# Patient Record
Sex: FEMALE | Race: WHITE | Employment: FULL TIME | URBAN - NONMETROPOLITAN AREA
[De-identification: names, ages, dates, MRNs, and addresses within clinical notes are randomized per-mention and may not be internally consistent; named-entity substitution may affect disease eponyms.]

---

## 2021-06-15 PROBLEM — J30.1 NON-SEASONAL ALLERGIC RHINITIS DUE TO POLLEN: Status: ACTIVE | Noted: 2020-06-25

## 2021-06-15 PROBLEM — J32.9 CHRONIC SINUSITIS: Status: ACTIVE | Noted: 2020-06-25

## 2021-06-15 PROBLEM — F41.1 GENERALIZED ANXIETY DISORDER: Status: ACTIVE | Noted: 2018-10-26

## 2021-09-10 ENCOUNTER — HOSPITAL ENCOUNTER (INPATIENT)
Age: 31
LOS: 2 days | Discharge: HOME | End: 2021-09-13
Attending: ADVANCED PRACTICE MIDWIFE | Admitting: ADVANCED PRACTICE MIDWIFE
Payer: COMMERCIAL

## 2021-09-10 PROBLEM — O47.9 UTERINE CONTRACTIONS DURING PREGNANCY: Status: ACTIVE | Noted: 2021-09-10

## 2021-09-10 LAB
BILIRUBIN URINE: NEGATIVE
COLOR: YELLOW
COMMENT UA: NORMAL
GLUCOSE URINE: NEGATIVE
KETONES, URINE: NEGATIVE
LEUKOCYTE ESTERASE, URINE: NEGATIVE
NITRITE, URINE: NEGATIVE
PH UA: 7 (ref 5–9)
PROTEIN UA: NEGATIVE
SPECIFIC GRAVITY UA: 1.01 (ref 1.01–1.02)
TURBIDITY: CLEAR
URINE HGB: NEGATIVE
UROBILINOGEN, URINE: NORMAL

## 2021-09-10 PROCEDURE — 87086 URINE CULTURE/COLONY COUNT: CPT

## 2021-09-10 PROCEDURE — 81003 URINALYSIS AUTO W/O SCOPE: CPT

## 2021-09-10 RX ORDER — ASPIRIN 81 MG/1
162 TABLET ORAL DAILY
Status: ON HOLD | COMMUNITY
End: 2021-09-13 | Stop reason: HOSPADM

## 2021-09-11 ENCOUNTER — ANESTHESIA EVENT (OUTPATIENT)
Dept: LABOR AND DELIVERY | Age: 31
End: 2021-09-11
Payer: COMMERCIAL

## 2021-09-11 ENCOUNTER — ANESTHESIA (OUTPATIENT)
Dept: LABOR AND DELIVERY | Age: 31
End: 2021-09-11
Payer: COMMERCIAL

## 2021-09-11 LAB
ABSOLUTE EOS #: 0.15 K/UL (ref 0–0.44)
ABSOLUTE IMMATURE GRANULOCYTE: 0.13 K/UL (ref 0–0.3)
ABSOLUTE LYMPH #: 3.39 K/UL (ref 1.1–3.7)
ABSOLUTE MONO #: 0.88 K/UL (ref 0.1–1.2)
BASOPHILS # BLD: 0 % (ref 0–2)
BASOPHILS ABSOLUTE: 0.04 K/UL (ref 0–0.2)
DIFFERENTIAL TYPE: ABNORMAL
EOSINOPHILS RELATIVE PERCENT: 1 % (ref 1–4)
GLUCOSE BLD-MCNC: 97 MG/DL (ref 74–100)
HCT VFR BLD CALC: 35.3 % (ref 36.3–47.1)
HEMOGLOBIN: 11.5 G/DL (ref 11.9–15.1)
IMMATURE GRANULOCYTES: 1 %
LYMPHOCYTES # BLD: 24 % (ref 24–43)
MCH RBC QN AUTO: 29.6 PG (ref 25.2–33.5)
MCHC RBC AUTO-ENTMCNC: 32.6 G/DL (ref 28.4–34.8)
MCV RBC AUTO: 91 FL (ref 82.6–102.9)
MONOCYTES # BLD: 6 % (ref 3–12)
NRBC AUTOMATED: 0 PER 100 WBC
PDW BLD-RTO: 13.7 % (ref 11.8–14.4)
PLATELET # BLD: 231 K/UL (ref 138–453)
PLATELET ESTIMATE: ABNORMAL
PMV BLD AUTO: 11.2 FL (ref 8.1–13.5)
RBC # BLD: 3.88 M/UL (ref 3.95–5.11)
RBC # BLD: ABNORMAL 10*6/UL
SEG NEUTROPHILS: 68 % (ref 36–65)
SEGMENTED NEUTROPHILS ABSOLUTE COUNT: 9.81 K/UL (ref 1.5–8.1)
WBC # BLD: 14.4 K/UL (ref 3.5–11.3)
WBC # BLD: ABNORMAL 10*3/UL

## 2021-09-11 PROCEDURE — 85025 COMPLETE CBC W/AUTO DIFF WBC: CPT

## 2021-09-11 PROCEDURE — 0HQ9XZZ REPAIR PERINEUM SKIN, EXTERNAL APPROACH: ICD-10-PCS | Performed by: OBSTETRICS & GYNECOLOGY

## 2021-09-11 PROCEDURE — 82947 ASSAY GLUCOSE BLOOD QUANT: CPT

## 2021-09-11 PROCEDURE — 2580000003 HC RX 258: Performed by: ADVANCED PRACTICE MIDWIFE

## 2021-09-11 PROCEDURE — 2500000003 HC RX 250 WO HCPCS: Performed by: NURSE ANESTHETIST, CERTIFIED REGISTERED

## 2021-09-11 PROCEDURE — 0UQMXZZ REPAIR VULVA, EXTERNAL APPROACH: ICD-10-PCS | Performed by: OBSTETRICS & GYNECOLOGY

## 2021-09-11 PROCEDURE — 6360000002 HC RX W HCPCS: Performed by: NURSE ANESTHETIST, CERTIFIED REGISTERED

## 2021-09-11 PROCEDURE — 7200000001 HC VAGINAL DELIVERY

## 2021-09-11 PROCEDURE — 36415 COLL VENOUS BLD VENIPUNCTURE: CPT

## 2021-09-11 PROCEDURE — 59025 FETAL NON-STRESS TEST: CPT | Performed by: ADVANCED PRACTICE MIDWIFE

## 2021-09-11 PROCEDURE — 10907ZC DRAINAGE OF AMNIOTIC FLUID, THERAPEUTIC FROM PRODUCTS OF CONCEPTION, VIA NATURAL OR ARTIFICIAL OPENING: ICD-10-PCS | Performed by: OBSTETRICS & GYNECOLOGY

## 2021-09-11 PROCEDURE — 1220000000 HC SEMI PRIVATE OB R&B

## 2021-09-11 PROCEDURE — 6370000000 HC RX 637 (ALT 250 FOR IP): Performed by: ADVANCED PRACTICE MIDWIFE

## 2021-09-11 PROCEDURE — 3700000025 EPIDURAL BLOCK: Performed by: NURSE ANESTHETIST, CERTIFIED REGISTERED

## 2021-09-11 PROCEDURE — 6360000002 HC RX W HCPCS: Performed by: ADVANCED PRACTICE MIDWIFE

## 2021-09-11 RX ORDER — ACETAMINOPHEN 325 MG/1
650 TABLET ORAL EVERY 4 HOURS PRN
Status: DISCONTINUED | OUTPATIENT
Start: 2021-09-11 | End: 2021-09-11

## 2021-09-11 RX ORDER — MODIFIED LANOLIN
OINTMENT (GRAM) TOPICAL PRN
Status: DISCONTINUED | OUTPATIENT
Start: 2021-09-11 | End: 2021-09-13 | Stop reason: HOSPADM

## 2021-09-11 RX ORDER — ONDANSETRON 2 MG/ML
4 INJECTION INTRAMUSCULAR; INTRAVENOUS EVERY 6 HOURS PRN
Status: DISCONTINUED | OUTPATIENT
Start: 2021-09-11 | End: 2021-09-13 | Stop reason: HOSPADM

## 2021-09-11 RX ORDER — LIDOCAINE HYDROCHLORIDE 10 MG/ML
30 INJECTION, SOLUTION EPIDURAL; INFILTRATION; INTRACAUDAL; PERINEURAL PRN
Status: DISCONTINUED | OUTPATIENT
Start: 2021-09-11 | End: 2021-09-11

## 2021-09-11 RX ORDER — SODIUM CHLORIDE 0.9 % (FLUSH) 0.9 %
10 SYRINGE (ML) INJECTION PRN
Status: DISCONTINUED | OUTPATIENT
Start: 2021-09-11 | End: 2021-09-13 | Stop reason: HOSPADM

## 2021-09-11 RX ORDER — NALOXONE HYDROCHLORIDE 0.4 MG/ML
0.4 INJECTION, SOLUTION INTRAMUSCULAR; INTRAVENOUS; SUBCUTANEOUS PRN
Status: DISCONTINUED | OUTPATIENT
Start: 2021-09-11 | End: 2021-09-11

## 2021-09-11 RX ORDER — SEVOFLURANE 250 ML/250ML
1 LIQUID RESPIRATORY (INHALATION) CONTINUOUS PRN
Status: DISCONTINUED | OUTPATIENT
Start: 2021-09-11 | End: 2021-09-11

## 2021-09-11 RX ORDER — NALBUPHINE HCL 10 MG/ML
10 AMPUL (ML) INJECTION
Status: DISCONTINUED | OUTPATIENT
Start: 2021-09-11 | End: 2021-09-11

## 2021-09-11 RX ORDER — LIDOCAINE HYDROCHLORIDE 20 MG/ML
INJECTION, SOLUTION EPIDURAL; INFILTRATION; INTRACAUDAL; PERINEURAL PRN
Status: DISCONTINUED | OUTPATIENT
Start: 2021-09-11 | End: 2021-09-11 | Stop reason: SDUPTHER

## 2021-09-11 RX ORDER — SODIUM CHLORIDE, SODIUM LACTATE, POTASSIUM CHLORIDE, CALCIUM CHLORIDE 600; 310; 30; 20 MG/100ML; MG/100ML; MG/100ML; MG/100ML
500 INJECTION, SOLUTION INTRAVENOUS PRN
Status: DISCONTINUED | OUTPATIENT
Start: 2021-09-11 | End: 2021-09-11

## 2021-09-11 RX ORDER — MISOPROSTOL 100 UG/1
900 TABLET ORAL PRN
Status: DISCONTINUED | OUTPATIENT
Start: 2021-09-11 | End: 2021-09-11

## 2021-09-11 RX ORDER — ROPIVACAINE HYDROCHLORIDE 2 MG/ML
INJECTION, SOLUTION EPIDURAL; INFILTRATION; PERINEURAL CONTINUOUS PRN
Status: DISCONTINUED | OUTPATIENT
Start: 2021-09-11 | End: 2021-09-11 | Stop reason: SDUPTHER

## 2021-09-11 RX ORDER — ROPIVACAINE HYDROCHLORIDE 2 MG/ML
12 INJECTION, SOLUTION EPIDURAL; INFILTRATION; PERINEURAL CONTINUOUS
Status: DISCONTINUED | OUTPATIENT
Start: 2021-09-11 | End: 2021-09-11

## 2021-09-11 RX ORDER — SODIUM CHLORIDE 0.9 % (FLUSH) 0.9 %
5-40 SYRINGE (ML) INJECTION PRN
Status: DISCONTINUED | OUTPATIENT
Start: 2021-09-11 | End: 2021-09-11

## 2021-09-11 RX ORDER — SODIUM CHLORIDE 0.9 % (FLUSH) 0.9 %
10 SYRINGE (ML) INJECTION EVERY 12 HOURS SCHEDULED
Status: DISCONTINUED | OUTPATIENT
Start: 2021-09-11 | End: 2021-09-13 | Stop reason: HOSPADM

## 2021-09-11 RX ORDER — DOCUSATE SODIUM 100 MG/1
100 CAPSULE, LIQUID FILLED ORAL 2 TIMES DAILY PRN
Status: DISCONTINUED | OUTPATIENT
Start: 2021-09-11 | End: 2021-09-13 | Stop reason: HOSPADM

## 2021-09-11 RX ORDER — METHYLERGONOVINE MALEATE 0.2 MG/ML
200 INJECTION INTRAVENOUS PRN
Status: DISCONTINUED | OUTPATIENT
Start: 2021-09-11 | End: 2021-09-11

## 2021-09-11 RX ORDER — CARBOPROST TROMETHAMINE 250 UG/ML
250 INJECTION, SOLUTION INTRAMUSCULAR PRN
Status: DISCONTINUED | OUTPATIENT
Start: 2021-09-11 | End: 2021-09-11

## 2021-09-11 RX ORDER — ONDANSETRON 2 MG/ML
4 INJECTION INTRAMUSCULAR; INTRAVENOUS EVERY 6 HOURS PRN
Status: DISCONTINUED | OUTPATIENT
Start: 2021-09-11 | End: 2021-09-11

## 2021-09-11 RX ORDER — SODIUM CHLORIDE, SODIUM LACTATE, POTASSIUM CHLORIDE, CALCIUM CHLORIDE 600; 310; 30; 20 MG/100ML; MG/100ML; MG/100ML; MG/100ML
INJECTION, SOLUTION INTRAVENOUS CONTINUOUS
Status: DISCONTINUED | OUTPATIENT
Start: 2021-09-11 | End: 2021-09-11

## 2021-09-11 RX ORDER — SODIUM CHLORIDE, SODIUM LACTATE, POTASSIUM CHLORIDE, CALCIUM CHLORIDE 600; 310; 30; 20 MG/100ML; MG/100ML; MG/100ML; MG/100ML
1000 INJECTION, SOLUTION INTRAVENOUS PRN
Status: DISCONTINUED | OUTPATIENT
Start: 2021-09-11 | End: 2021-09-11

## 2021-09-11 RX ORDER — ROPIVACAINE HYDROCHLORIDE 2 MG/ML
10 INJECTION, SOLUTION EPIDURAL; INFILTRATION; PERINEURAL CONTINUOUS
Status: DISCONTINUED | OUTPATIENT
Start: 2021-09-11 | End: 2021-09-11

## 2021-09-11 RX ORDER — ACETAMINOPHEN 325 MG/1
650 TABLET ORAL EVERY 4 HOURS PRN
Status: DISCONTINUED | OUTPATIENT
Start: 2021-09-11 | End: 2021-09-13 | Stop reason: HOSPADM

## 2021-09-11 RX ORDER — SODIUM CHLORIDE 0.9 % (FLUSH) 0.9 %
5-40 SYRINGE (ML) INJECTION EVERY 12 HOURS SCHEDULED
Status: DISCONTINUED | OUTPATIENT
Start: 2021-09-11 | End: 2021-09-11

## 2021-09-11 RX ORDER — FAMOTIDINE 20 MG/1
20 TABLET, FILM COATED ORAL 2 TIMES DAILY PRN
Status: DISCONTINUED | OUTPATIENT
Start: 2021-09-11 | End: 2021-09-13 | Stop reason: HOSPADM

## 2021-09-11 RX ORDER — SODIUM CHLORIDE 9 MG/ML
25 INJECTION, SOLUTION INTRAVENOUS PRN
Status: DISCONTINUED | OUTPATIENT
Start: 2021-09-11 | End: 2021-09-13 | Stop reason: HOSPADM

## 2021-09-11 RX ORDER — EPHEDRINE SULFATE/0.9% NACL/PF 50 MG/5 ML
5 SYRINGE (ML) INTRAVENOUS EVERY 5 MIN PRN
Status: DISCONTINUED | OUTPATIENT
Start: 2021-09-11 | End: 2021-09-11

## 2021-09-11 RX ORDER — IBUPROFEN 800 MG/1
800 TABLET ORAL EVERY 8 HOURS
Status: DISCONTINUED | OUTPATIENT
Start: 2021-09-11 | End: 2021-09-13 | Stop reason: HOSPADM

## 2021-09-11 RX ORDER — SODIUM CHLORIDE 9 MG/ML
25 INJECTION, SOLUTION INTRAVENOUS PRN
Status: DISCONTINUED | OUTPATIENT
Start: 2021-09-11 | End: 2021-09-11

## 2021-09-11 RX ADMIN — SODIUM CHLORIDE, POTASSIUM CHLORIDE, SODIUM LACTATE AND CALCIUM CHLORIDE: 600; 310; 30; 20 INJECTION, SOLUTION INTRAVENOUS at 08:26

## 2021-09-11 RX ADMIN — SODIUM CHLORIDE, POTASSIUM CHLORIDE, SODIUM LACTATE AND CALCIUM CHLORIDE 1000 ML: 600; 310; 30; 20 INJECTION, SOLUTION INTRAVENOUS at 07:27

## 2021-09-11 RX ADMIN — ONDANSETRON 4 MG: 2 INJECTION INTRAMUSCULAR; INTRAVENOUS at 14:04

## 2021-09-11 RX ADMIN — IBUPROFEN 800 MG: 800 TABLET, FILM COATED ORAL at 17:07

## 2021-09-11 RX ADMIN — LIDOCAINE HYDROCHLORIDE 3 ML: 20 INJECTION, SOLUTION EPIDURAL; INFILTRATION; INTRACAUDAL; PERINEURAL at 08:12

## 2021-09-11 RX ADMIN — Medication: at 17:08

## 2021-09-11 RX ADMIN — ROPIVACAINE HYDROCHLORIDE 12 ML/HR: 2 INJECTION, SOLUTION EPIDURAL; INFILTRATION at 08:20

## 2021-09-11 RX ADMIN — LIDOCAINE HYDROCHLORIDE 2 ML: 20 INJECTION, SOLUTION EPIDURAL; INFILTRATION; INTRACAUDAL; PERINEURAL at 08:10

## 2021-09-11 RX ADMIN — BENZOCAINE AND LEVOMENTHOL: 200; 5 SPRAY TOPICAL at 20:13

## 2021-09-11 RX ADMIN — Medication 1 MILLI-UNITS/MIN: at 10:09

## 2021-09-11 RX ADMIN — Medication 40 EACH: at 20:13

## 2021-09-11 RX ADMIN — SODIUM CHLORIDE, POTASSIUM CHLORIDE, SODIUM LACTATE AND CALCIUM CHLORIDE: 600; 310; 30; 20 INJECTION, SOLUTION INTRAVENOUS at 09:58

## 2021-09-11 ASSESSMENT — PAIN DESCRIPTION - DESCRIPTORS
DESCRIPTORS: CRAMPING;SHARP
DESCRIPTORS: ACHING;CRAMPING
DESCRIPTORS: ACHING;CRAMPING

## 2021-09-11 ASSESSMENT — PAIN SCALES - GENERAL: PAINLEVEL_OUTOF10: 3

## 2021-09-11 NOTE — FLOWSHEET NOTE
JEFRY Murdock CNM called with episode. Told vitals and reactive tracing. She would like  A POCT BS and she will let Dr González Cunningham know. She would like bed padded also.

## 2021-09-11 NOTE — FLOWSHEET NOTE
JEFRY duncanm updated on pt. SVE, ctx pattern, pain level et pt requesting epidural. No further orders at this time.

## 2021-09-11 NOTE — PROGRESS NOTES
I was called into room post episode. It was visualized pt had /53. Pt knew who she was and had no loss of stool or loss of urine. When talking with pt she states in her past she has had episodes of passing out. At this point in time. Vistas are stable and pulse oz 100. Nurses are calling in CRNA for evaluation as well. RN also talking with [place] Doing.

## 2021-09-11 NOTE — ANESTHESIA PRE PROCEDURE
Department of Anesthesiology  Preprocedure Note       Name:  Juany Lira   Age:  28 y.o.  :  1990                                          MRN:  [id number]         Date:  2021      Surgeon: * No surgeons listed *    Procedure: * No procedures listed *    Medications prior to admission:   Prior to Admission medications    Medication Sig Start Date End Date Taking?  Authorizing Provider   aspirin 81 MG EC tablet Take 162 mg by mouth daily   Yes Historical Provider, MD   montelukast (SINGULAIR) 10 MG tablet Take 1 tablet by mouth once a day 6/15/21  Yes Gia Sloan MD   Prenatal MV-Min-Fe Fum-FA-DHA (PRENATAL 1 PO) Take by mouth   Yes Historical Provider, MD   budesonide (RHINOCORT ALLERGY) 32 MCG/ACT nasal spray 1 spray by Each Nostril route daily   Yes Historical Provider, MD   cetirizine (ZYRTEC) 10 MG tablet Take 10 mg by mouth daily   Yes Historical Provider, MD       Current medications:    Current Facility-Administered Medications   Medication Dose Route Frequency Provider Last Rate Last Admin    oxytocin (PITOCIN) 30 units in 500 mL infusion  1 jessenia-units/min IntraVENous Continuous Gavi Likens, APRN - CNM        lactated ringers infusion   IntraVENous Continuous Gavi Likens, APRN - CNM        lactated ringers infusion 500 mL  500 mL IntraVENous PRN Gavi Likens, APRN - CNM        Or    lactated ringers infusion 1,000 mL  1,000 mL IntraVENous PRN Gavi Likens, APRN -  mL/hr at 21 0727 1,000 mL at 21 0727    sodium chloride flush 0.9 % injection 5-40 mL  5-40 mL IntraVENous 2 times per day Gavi Likens, APRN - CNM        sodium chloride flush 0.9 % injection 5-40 mL  5-40 mL IntraVENous PRN Gavi Likens, APRN - CNM        0.9 % sodium chloride infusion  25 mL IntraVENous PRN Gavi Likens, APRN - CNM        lidocaine PF 1 % injection 30 mL  30 mL Other PRN Gavi Likens, APRN - CNM        nalbuphine (NUBAIN) injection 10 mg  10 mg IntraVENous Q2H PRN Geovanna Emms, APRN - CNM        nitrous oxide 50% inhalation 1 each  1 each Inhalation Continuous PRN Geovanna Emms, APRN - CNM        ondansetron [facility]) injection 4 mg  4 mg IntraVENous Q6H PRN Geovanna Emms, APRN - CNM        oxytocin (PITOCIN) 30 units in 500 mL infusion  166 jessenia-units/min IntraVENous PRN Geovanna Emms, APRN - CNM        And    oxytocin (PITOCIN) 10 unit bolus from the bag  999 mL/hr IntraVENous PRN Geovanna Emms, APRN - CNM        methylergonovine (METHERGINE) injection 200 mcg  200 mcg IntraMUSCular PRN Geovanna Emms, APRN - CNM        carboprost (HEMABATE) injection 250 mcg  250 mcg IntraMUSCular PRN Geovanna Emms, APRN - CNM        miSOPROStol (CYTOTEC) tablet 900 mcg  900 mcg Rectal PRN Geovanna Emms, APRN - CNM        acetaminophen (TYLENOL) tablet 650 mg  650 mg Oral Q4H PRN Geovanna Emms, APRN - CNM        witch hazel-glycerin (TUCKS) pad   Topical PRN Geovanna Emms, APRN - CNM        benzocaine-menthol (DERMOPLAST) 20-0.5 % spray   Topical PRN Geovanna Emms, APRN - CNM           Allergies:     Allergies   Allergen Reactions    Cat Hair Extract Other (See Comments)     Periorbital swelling    Dust Mite Extract      Periorbital swelling    Tree Extract      Periorbital swelling       Problem List:    Patient Active Problem List   Diagnosis Code    Chronic sinusitis J32.9    Generalized anxiety disorder F41.1    Non-seasonal allergic rhinitis due to pollen J30.1    Uterine contractions during pregnancy O62.2       Past Medical History:        Diagnosis Date    Allergic rhinitis     Anxiety     Environmental allergies        Past Surgical History:        Procedure Laterality Date    SINUS SURGERY  08/2020    TONSILLECTOMY      WISDOM TOOTH EXTRACTION         Social History:    Social History     Tobacco Use    Smoking status: Never Smoker    Smokeless tobacco: Never Used   Substance Use Topics    Alcohol use: Not Currently                                Counseling given: Not Answered      Vital Signs (Current):   Vitals:    09/10/21 2252 09/11/21 0110 09/11/21 0325   BP:  128/77 129/79   Pulse:  69 83   Resp:  18 18   Temp:  36.4 °C (97.6 °F) 36.4 °C (97.6 °F)   Weight: 234 lb (106.1 kg)     Height: 5' 10\" (1.778 m)                                                BP Readings from Last 3 Encounters:   09/11/21 129/79   09/08/21 115/76   09/01/21 115/75       NPO Status:                                                                                 BMI:   Wt Readings from Last 3 Encounters:   09/10/21 234 lb (106.1 kg)   09/08/21 234 lb 4 oz (106.3 kg)   09/01/21 232 lb 6 oz (105.4 kg)     Body mass index is 33.58 kg/m². CBC:   Lab Results   Component Value Date    WBC 14.4 09/11/2021    RBC 3.88 09/11/2021    RBC 3.79 07/22/2021    HGB 11.5 09/11/2021    HCT 35.3 09/11/2021    MCV 91.0 09/11/2021    RDW 13.7 09/11/2021     09/11/2021       CMP: No results found for: NA, K, CL, CO2, BUN, CREATININE, GFRAA, AGRATIO, LABGLOM, GLUCOSE, PROT, CALCIUM, BILITOT, ALKPHOS, AST, ALT    POC Tests: No results for input(s): POCGLU, POCNA, POCK, POCCL, POCBUN, POCHEMO, POCHCT in the last 72 hours.     Coags: No results found for: PROTIME, INR, APTT    HCG (If Applicable):   Lab Results   Component Value Date    PREGTESTUR NEG 01/25/2021        ABGs: No results found for: PHART, PO2ART, XWV6MIF, JVX0LWL, BEART, O0RWEECO     Type & Screen (If Applicable):  Lab Results   Component Value Date    [address] POSITIVE 02/15/2021       Drug/Infectious Status (If Applicable):  No results found for: HIV, HEPCAB    COVID-19 Screening (If Applicable): No results found for: COVID19        Anesthesia Evaluation  Patient summary reviewed and Nursing notes reviewed no history of anesthetic complications:   Airway: Mallampati: I  TM distance: >3 FB   Neck ROM: full  Mouth opening: > = 3 FB Dental: normal exam         Pulmonary:Negative Pulmonary ROS

## 2021-09-11 NOTE — L&D DELIVERY NOTE
disposition: Lab     Placenta    Date/time: 2021 15:32:00  Removal: Spontaneous  Appearance: Intact  Disposition: Discarded     Delivery Resuscitation    Method: Bulb Suction, Stimulation     Apgars    Living status: Living  Apgars   1 Minute:  5 Minute:  10 Minute 15 Minute 20 Minute   Skin Color: 1  1       Heart Rate: 2  2       Reflex Irritability: 2  2       Muscle Tone: 2  2       Respiratory Effort: 2  2       Total: 9  9               Apgars Assigned Gely Michael RN     Skin to Skin    Skin to skin initiation date/time: 21 15:26:00 EDT   Skin to skin with:  Mother  Skin to skin end date/time:        [place] Measurements       Delivery Information    Episiotomy: None  Perineal lacerations: 1st Repaired: Yes   Labial laceration: right Repaired: Yes   Vaginal laceration: No    Cervical laceration: No    Surgical or additional est. blood loss (mL): 0 (View Only): Edit in Flowsheets   Combined est. blood loss (mL): 0     Vaginal Delivery Counts    Initial count personnel: Alice Gongora LPN  Initial count verified by: Kailyn Neves RN   4x4:  Needles:  Instruments:  Lap Pads:  Sponges:    Initial counts:         Final counts:            Other Procedures    Procedures: None

## 2021-09-11 NOTE — PROGRESS NOTES
Pt has had some cervical change and we will continue to monitor pt and see if she continues making change. Labor orders are lace for is she goes into active labor.

## 2021-09-11 NOTE — H&P
Department of Obstetrics and Gynecology   Obstetrics History and Physical  H&P Admission Inpatient  Note        CHIEF COMPLAINT:    Chief Complaint   Patient presents with    Contractions        HISTORY OF PRESENT ILLNESS:      The patient is a 28 y.o. female at 37w0d.   OB History        1    Para   0    Term   0       0    AB   0    Living   0       SAB   0    TAB   0    Ectopic   0    Molar   0    Multiple   0    Live Births   0            Patient presents with a chief complaint as above and is being admitted for labor    Estimated Due Date: Estimated Date of Delivery: 21    PRENATAL CARE:    Complicated by: none    PAST OB HISTORY:  OB History        1    Para   0    Term   0       0    AB   0    Living   0       SAB   0    TAB   0    Ectopic   0    Molar   0    Multiple   0    Live Births   0                Past Medical History:        Diagnosis Date    Allergic rhinitis     Anxiety     Environmental allergies      Past Surgical History:        Procedure Laterality Date    SINUS SURGERY  2020    TONSILLECTOMY      WISDOM TOOTH EXTRACTION       Allergies:  Cat hair extract, Dust mite extract, and Tree extract    Social History:    Social History     Socioeconomic History    Marital status:      Spouse name: Not on file    Number of children: Not on file    Years of education: Not on file    Highest education level: Not on file   Occupational History    Not on file   Tobacco Use    Smoking status: Never Smoker    Smokeless tobacco: Never Used   Vaping Use    Vaping Use: Never used   Substance and Sexual Activity    Alcohol use: Not Currently    Drug use: Never    Sexual activity: Yes     Partners: Male   Other Topics Concern    Not on file   Social History Narrative    Not on file     Social Determinants of Health     Financial Resource Strain:     Difficulty of Paying Living Expenses:    Food Insecurity:     Worried About Running Out of Food in the Last Year:     Ran Out of Food in the Last Year:    Transportation Needs:     Lack of Transportation (Medical):  Lack of Transportation (Non-Medical):    Physical Activity:     Days of Exercise per Week:     Minutes of Exercise per Session:    Stress:     Feeling of Stress :    Social Connections:     Frequency of Communication with Friends and Family:     Frequency of Social Gatherings with Friends and Family:     Attends [employer] Services:     Active Member of Clubs or Organizations:     Attends Club or Organization Meetings:     Marital Status:    Intimate Partner Violence:     Fear of Current or Ex-Partner:     Emotionally Abused:     Physically Abused:     Sexually Abused:      Family History:       Problem Relation Age of Onset    Breast Cancer Maternal Aunt 28    Heart Disease Maternal Grandmother      Medications Prior to Admission:  Medications Prior to Admission: aspirin 81 MG EC tablet, Take 162 mg by mouth daily  montelukast (SINGULAIR) 10 MG tablet, Take 1 tablet by mouth once a day  Prenatal MV-Min-Fe Fum-FA-DHA (PRENATAL 1 PO), Take by mouth  budesonide (RHINOCORT ALLERGY) 32 MCG/ACT nasal spray, 1 spray by Each Nostril route daily  cetirizine (ZYRTEC) 10 MG tablet, Take 10 mg by mouth daily    REVIEW OF SYSTEMS:    CONSTITUTIONAL:  negative  RESPIRATORY:  negative  CARDIOVASCULAR:  negative  GASTROINTESTINAL:  negative  ALLERGIC/IMMUNOLOGIC:  negative  NEUROLOGICAL:  negative  BEHAVIOR/PSYCH:  negative    PHYSICAL EXAM:  Vitals:    09/11/21 1123 09/11/21 1128 09/11/21 1131 09/11/21 1133   BP:       Pulse:       Resp:       Temp:       SpO2: 100% 100% 92% 98%   Weight:       Height:         General appearance:  awake, alert, cooperative, no apparent distress, and appears stated age  Neurologic:  Awake, alert, oriented to name, place and time.     Lungs:  No increased work of breathing, good air exchange  Abdomen:  Soft, non tender, gravid, consistent with her gestational age, EFW by Wilfredo Manzanares was 7-15   Fetal heart rate:  Reassuring.   Pelvis:  Adequate pelvis  Cervix: 6 cm 100% soft -1  Contraction frequency:  2-4 minutes    Membranes:  Ruptured clear fluid    Labs:  Blood Type: A POSITIVE    Rubella:  No results found for: RUBG  T. Pallidium, IGG:  No results found for: TREPG  Sickle Cell Screen: No results found for: SICKLE  Hepatitis B Surface Antigen:   Hepatitis B Surface Ag   Date Value Ref Range Status   02/15/2021 Negative Negative Final     HIV:  No results found for: [id number]           Results for orders placed or performed during the hospital encounter of 09/10/21   Urinalysis   Result Value Ref Range    Color, UA YELLOW YELLOW    Turbidity UA CLEAR CLEAR    Glucose, Ur NEGATIVE NEGATIVE    Bilirubin Urine NEGATIVE NEGATIVE    Ketones, Urine NEGATIVE NEGATIVE    Specific [place], UA 1.010 1.010 - 1.020    Urine Hgb NEGATIVE NEGATIVE    pH, UA 7.0 5.0 - 9.0    Protein, UA NEGATIVE NEGATIVE    Urobilinogen, Urine Normal Normal    Nitrite, Urine NEGATIVE NEGATIVE    Leukocyte Esterase, Urine NEGATIVE NEGATIVE    Urinalysis Comments NOT REPORTED    CBC auto differential   Result Value Ref Range    WBC 14.4 (H) 3.5 - 11.3 k/uL    RBC 3.88 (L) 3.95 - 5.11 m/uL    Hemoglobin 11.5 (L) 11.9 - 15.1 g/dL    Hematocrit 35.3 (L) 36.3 - 47.1 %    MCV 91.0 82.6 - 102.9 fL    MCH 29.6 25.2 - 33.5 pg    MCHC 32.6 28.4 - 34.8 g/dL    RDW 13.7 11.8 - 14.4 %    Platelets 883 730 - 979 k/uL    MPV 11.2 8.1 - 13.5 fL    NRBC Automated 0.0 0.0 per 100 WBC    Differential Type NOT REPORTED     Seg Neutrophils 68 (H) 36 - 65 %    Lymphocytes 24 24 - 43 %    Monocytes 6 3 - 12 %    Eosinophils % 1 1 - 4 %    Basophils 0 0 - 2 %    Immature Granulocytes 1 (H) 0 %    Segs Absolute 9.81 (H) 1.50 - 8.10 k/uL    Absolute Lymph # 3.39 1.10 - 3.70 k/uL    Absolute Mono # 0.88 0.10 - 1.20 k/uL    Absolute Eos # 0.15 0.00 - 0.44 k/uL    Basophils Absolute 0.04 0.00 - 0.20 k/uL    Absolute Immature Granulocyte 0.13 0.00 - 0.30 k/uL    WBC Morphology NOT REPORTED     RBC Morphology NOT REPORTED     Platelet Estimate NOT REPORTED    Glucose, Whole Blood   Result Value Ref Range    POC Glucose 97 74 - 100 mg/dL       ASSESSMENT AND PLAN:    Estimated length of stay: 2 midnights post vaginal delivery    Active Problems:    Uterine contractions during pregnancy     40 weeks pregnancy      Labor: Admit, anticipate normal delivery, routine labor orders  Fetus: Reassuring, Cat 1  GBS: No  Other: pitocin    Reviewed plan with Dr. Lawrence Och updated      Janel Kaur, APRN - YUDYM,CNM,9/11/2021 11:40 AM

## 2021-09-11 NOTE — Clinical Note
Called at 1953 to come see patient due to episode where patient experienced dizziness and became briefly obtunded per nurse, seemed dazed for brief period of time. Patient is now alert and oriented, VS are stable, and were stable throughout event. Patient states she was talking with  from [facility] on the phone when she felt pressure up against her chest, as though pressure pushing up from abdomen into chest and felt \"strange\"  She said she asked her mom to see if her heart rate was ok and says her mom told her it was in the [de-identified]. She then became very dizzy and felt like she was going to pass out. Review of BP during this event shows abberantly elevated BP of 165/58 at 903, followed by dip in BP of 110/53, then stabilizing back to SBP of 120's - 130's. 100% NRB was placed by nursing staff and NABIL BATRES was in to assess patient.

## 2021-09-11 NOTE — ANESTHESIA PROCEDURE NOTES
Epidural Block    Patient location during procedure: OB  Start time: 9/11/2021 8:03 AM  End time: 9/11/2021 8:20 AM  Reason for block: labor epidural  Staffing  Performed: resident/CRNA   Resident/CRNA: JORDAN Mulligan - CRNA  Preanesthetic Checklist  Completed: patient identified, IV checked, site marked, risks and benefits discussed, surgical consent, monitors and equipment checked, pre-op evaluation, timeout performed, anesthesia consent given, oxygen available and patient being monitored  Epidural  Patient position: sitting  Prep: ChloraPrep and site prepped and draped  Patient monitoring: continuous pulse ox and frequent blood pressure checks  Approach: midline  Location: lumbar (1-5)  Injection technique: MACI air  Provider prep: mask and sterile gloves  Needle  Needle type: Tuohy   Needle gauge: 17 G  Needle length: 3.5 in  Needle insertion depth: 6 cm  Catheter type: side hole  Catheter size: 19 G  Catheter at skin depth: 12 cm  Test dose: negative (5 ml lidocaine 1.5%+epi 1:200K @ 0809)  Kit: Sleep Solutionsun Perifix FX  Lot number: [id number]  Expiration date: 1/31/2023  Assessment  Sensory level: T8  Hemodynamics: stable  Attempts: 1  Additional Notes  0803 skin prep and kit prep  0805 drape and lidocaine 1% SQ infiltration at L3-4 area  0808 epidural space achieved easily x 1 attempt, catheter threaded without difficulty  0809 test dose then catheter taped in place and incrementally bolused as charte  0820 epidural infusion initiated and PCEA instructions given to patient, questions answered. Patient feeling more comfortable now.

## 2021-09-11 NOTE — PROGRESS NOTES
Called at 0390 to come see patient due to episode where patient experienced dizziness and became briefly obtunded per nurse, seemed dazed for brief period of time. Patient is now alert and oriented, VS are stable, and were stable throughout event. Patient states she was talking with  from [facility] on the phone when she felt pressure up against her chest, as though pressure pushing up from abdomen into chest and felt \"strange\"  She said she asked her mom to see if her heart rate was ok and says her mom told her it was in the [de-identified]. She then became very dizzy and felt like she was going to pass out. Review of BP during this event shows abberantly elevated BP of 165/58 at 903, followed by dip in BP of 110/53, then stabilizing back to SBP of 120's - 130's. 100% NRB was placed by nursing staff and NABIL BATRES was in to assess patient.

## 2021-09-11 NOTE — FLOWSHEET NOTE
Called to pt room. Pt told her mother that she \"felt weird\" pt head turned to side, eyes half open with glassy look. Sternal rub given, oxygen on, iv fluids increased. Pt comes around after approx 2 min NABIL Leonard CNM at bedside. Pt states that she can remember feeling weird. And then her vision went blurry. Milton Eisenberg RN calls [employer] CN.

## 2021-09-12 LAB
CULTURE: NORMAL
Lab: NORMAL
SPECIMEN DESCRIPTION: NORMAL

## 2021-09-12 PROCEDURE — 1220000000 HC SEMI PRIVATE OB R&B

## 2021-09-12 PROCEDURE — 6370000000 HC RX 637 (ALT 250 FOR IP): Performed by: ADVANCED PRACTICE MIDWIFE

## 2021-09-12 PROCEDURE — 0503F POSTPARTUM CARE VISIT: CPT | Performed by: ADVANCED PRACTICE MIDWIFE

## 2021-09-12 RX ADMIN — IBUPROFEN 800 MG: 800 TABLET, FILM COATED ORAL at 03:26

## 2021-09-12 RX ADMIN — IBUPROFEN 800 MG: 800 TABLET, FILM COATED ORAL at 12:08

## 2021-09-12 ASSESSMENT — PAIN SCALES - GENERAL
PAINLEVEL_OUTOF10: 5
PAINLEVEL_OUTOF10: 4

## 2021-09-12 NOTE — ANESTHESIA POSTPROCEDURE EVALUATION
Department of Anesthesiology  Postprocedure Note    Patient: Vikram Ag  MRN: [id number]  YOB: 1990  Date of evaluation: 9/12/2021  Time:  10:31 AM     Procedure Summary     Date: 09/11/21 Room / Location:     Anesthesia Start: 0803 Anesthesia Stop: 2397    Procedure: Labor Analgesia Diagnosis:     Scheduled Providers:  Responsible Provider: JORDAN Ballard CRNA    Anesthesia Type: epidural ASA Status: 2          Anesthesia Type: epidural    Alex Phase I:      Alex Phase II:      Last vitals: Reviewed and per EMR flowsheets.        Anesthesia Post Evaluation    Patient location during evaluation: floor  Patient participation: complete - patient participated  Level of consciousness: awake and alert  Airway patency: patent  Nausea & Vomiting: no nausea and no vomiting  Complications: no  Cardiovascular status: hemodynamically stable  Respiratory status: acceptable and room air  Hydration status: stable

## 2021-09-12 NOTE — PROGRESS NOTES
Department of Obstetrics and Gynecology  Labor and Delivery       Post Partum Progress Note             SUBJECTIVE:  Pt doing well today but needing breastfeeding support and is a little tired    OBJECTIVE:      Vitals:  /61   Pulse 68   Temp 98.5 °F (36.9 °C)   Resp 16   Ht 5' 10\" (1.778 m)   Wt 234 lb (106.1 kg)   LMP 12/07/2020 (Approximate)   SpO2 98%   BMI 33.58 kg/m²   Patient Vitals for the past 24 hrs:   BP Temp Pulse Resp SpO2   09/12/21 0738 113/61 98.5 °F (36.9 °C) 68 16    09/12/21 0325 125/62 98 °F (36.7 °C) 75 16    09/11/21 2324 123/69 97.9 °F (36.6 °C) 71 18    09/11/21 2011 116/70 98.3 °F (36.8 °C) 105 16    09/11/21 1744 123/63  75     09/11/21 1727 126/64  82     09/11/21 1712 130/65  89     09/11/21 1657 126/68  83     09/11/21 1642 (!) 114/58  75     09/11/21 1627 122/86  79     09/11/21 1612 120/65  86     09/11/21 1542 (!) 141/69  89     09/11/21 1527 (!) 143/74  137     09/11/21 1513     98 %   09/11/21 1512 139/67  118     09/11/21 1508     98 %   09/11/21 1503     99 %   09/11/21 1458     100 %   09/11/21 1457 134/84  109     09/11/21 1456     93 %   09/11/21 1453     100 %   09/11/21 1448     100 %   09/11/21 1443 130/77  87  100 %   09/11/21 1441     91 %   09/11/21 1438     100 %   09/11/21 1433     100 %   09/11/21 1428     100 %   09/11/21 1427 125/73  88     09/11/21 1423     100 %   09/11/21 1418     100 %   09/11/21 1413     100 %   09/11/21 1412 123/71  88 18    09/11/21 1408     100 %   09/11/21 1403     100 %   09/11/21 1358     100 %   09/11/21 1357 122/62  86     09/11/21 1353     100 %   09/11/21 1348     100 %   09/11/21 1343     100 %   09/11/21 1342 132/76  86     09/11/21 1338     100 %   09/11/21 1337     90 %   09/11/21 1333     100 %   09/11/21 1328     100 %   09/11/21 1327 125/77 98.5 °F (36.9 °C) 100 18  09/11/21 1323     100 %   09/11/21 1318     100 %   09/11/21 1315 124/72 98.5 °F (36.9 °C) 99 18 99 %   09/11/21 1308     100 %   09/11/21 1303     100 %   09/11/21 1300     100 %   09/11/21 1258     100 %   09/11/21 1253     100 %   09/11/21 1248     100 %   09/11/21 1245 131/68  86     09/11/21 1243     100 %   09/11/21 1238     100 %   09/11/21 1233     100 %   09/11/21 1230 134/77  93     09/11/21 1228     100 %   09/11/21 1223     100 %   09/11/21 1218     100 %   09/11/21 1213 (!) 113/56  82  100 %   09/11/21 1208     99 %   09/11/21 1203     100 %   09/11/21 1158     99 %   09/11/21 1157 (!) 112/55  82     09/11/21 1153     100 %   09/11/21 1148     98 %   09/11/21 1143 (!) 111/51  78  99 %   09/11/21 1138     99 %   09/11/21 1133     98 %   09/11/21 1131     92 %   09/11/21 1128     100 %   09/11/21 1127 126/74  99     09/11/21 1123     100 %   09/11/21 1118     100 %   09/11/21 1113     100 %   09/11/21 1112 123/64  85 18    09/11/21 1108     100 %   09/11/21 1103  98.2 °F (36.8 °C)   100 %   09/11/21 1058     100 %   09/11/21 1057 (!) 122/57  81     09/11/21 1053     100 %   09/11/21 1048     100 %   09/11/21 1043     100 %   09/11/21 1038 (!) 123/55  80  100 %   09/11/21 1033 129/64  77  100 %   09/11/21 1029 128/66  81     09/11/21 1028     100 %   09/11/21 1024 131/62  78     09/11/21 1023     100 %   09/11/21 1018 132/64  82  100 %   09/11/21 1012 106/63  96  100 %   09/11/21 1009 123/63  82     09/11/21 1008     100 %   09/11/21 1003 125/64  75  100 %   09/11/21 0958 (!) 121/59  80  100 %   09/11/21 0953 (!) 122/57  75  100 %   09/11/21 0948     100 %   09/11/21 0947 129/60  77     09/11/21 0944 (!) 117/57  77     09/11/21 0943     99 %   09/11/21 0941 125/68  88           ABDOMEN: normal shape,

## 2021-09-12 NOTE — PLAN OF CARE
Problem: Anxiety:  Goal: Level of anxiety will decrease  Description: Level of anxiety will decrease  Outcome: Ongoing     Problem: Breathing Pattern - Ineffective:  Goal: Able to breathe comfortably  Description: Able to breathe comfortably  Outcome: Ongoing     Problem: Fluid Volume - Imbalance:  Goal: Absence of imbalanced fluid volume signs and symptoms  Description: Absence of imbalanced fluid volume signs and symptoms  Outcome: Ongoing  Goal: Absence of intrapartum hemorrhage signs and symptoms  Description: Absence of intrapartum hemorrhage signs and symptoms  Outcome: Ongoing     Problem: Infection - Intrapartum Infection:  Goal: Will show no infection signs and symptoms  Description: Will show no infection signs and symptoms  Outcome: Ongoing     Problem:  Screening:  Goal: Ability to make informed decisions regarding treatment has improved  Description: Ability to make informed decisions regarding treatment has improved  Outcome: Ongoing     Problem: Pain - Acute:  Goal: Pain level will decrease  Description: Pain level will decrease  Outcome: Ongoing  Goal: Able to cope with pain  Description: Able to cope with pain  Outcome: Ongoing     Problem: Urinary Retention:  Goal: Experiences of bladder distention will decrease  Description: Experiences of bladder distention will decrease  Outcome: Ongoing  Goal: Urinary elimination within specified parameters  Description: Urinary elimination within specified parameters  Outcome: Ongoing     Problem: Pain:  Goal: Pain level will decrease  Description: Pain level will decrease  Outcome: Ongoing  Goal: Control of acute pain  Description: Control of acute pain  Outcome: Ongoing  Goal: Control of chronic pain  Description: Control of chronic pain  Outcome: Ongoing

## 2021-09-13 VITALS
HEART RATE: 56 BPM | WEIGHT: 234 LBS | TEMPERATURE: 98.3 F | BODY MASS INDEX: 33.5 KG/M2 | OXYGEN SATURATION: 98 % | RESPIRATION RATE: 16 BRPM | HEIGHT: 70 IN | DIASTOLIC BLOOD PRESSURE: 62 MMHG | SYSTOLIC BLOOD PRESSURE: 109 MMHG

## 2021-09-13 PROCEDURE — 6370000000 HC RX 637 (ALT 250 FOR IP): Performed by: ADVANCED PRACTICE MIDWIFE

## 2021-09-13 RX ORDER — MODIFIED LANOLIN
1 OINTMENT (GRAM) TOPICAL PRN
Qty: 1 EACH | Refills: 3 | Status: SHIPPED | OUTPATIENT
Start: 2021-09-13 | End: 2022-02-02

## 2021-09-13 RX ORDER — DOCUSATE SODIUM 100 MG/1
100 CAPSULE, LIQUID FILLED ORAL 2 TIMES DAILY PRN
Qty: 60 CAPSULE | Refills: 1 | Status: SHIPPED | OUTPATIENT
Start: 2021-09-13 | End: 2021-10-13

## 2021-09-13 RX ADMIN — IBUPROFEN 800 MG: 800 TABLET, FILM COATED ORAL at 05:33

## 2021-09-13 ASSESSMENT — PAIN SCALES - GENERAL: PAINLEVEL_OUTOF10: 5

## 2021-09-13 NOTE — DISCHARGE SUMMARY
Obstetrical Discharge Form      Gestational Age:40w2d    Antepartum complications: none    Date of Delivery: 21      Type of Delivery: vaginal, spontaneous      Delivered By:   JAYSON Lamas         Baby:   Information for the patient's :  Zenobia Wang [[id number]]          Anesthesia: Epidural    Intrapartum complications: None    Feeding method: breast    Blood type: A POSITIVE      Rubella:  No results found for: RUBG  T. Pallidium, IGG:  No results found for: TREPG  Hepatitis B Surface Antigen:   Hepatitis B Surface Ag   Date Value Ref Range Status   02/15/2021 Negative Negative Final     HIV:  No results found for: LJR14YD    Postpartum complications: none    Discharge Medication:    Terry Jean Baptiste   Home Medication Instructions DOC:[id number]    Printed on:21 0955   Medication Information                      benzocaine-menthol (DERMOPLAST) 20-0.5 % AERO spray  Apply 1 applicator topically 2 times daily             docusate sodium (COLACE) 100 MG capsule  Take 1 capsule by mouth 2 times daily as needed for Constipation             lansinoh lanolin CREA ointment  Apply 1 applicator topically as needed for Dry Skin (nipple discomfort)                  Discharge Date: 2021    Discharged to home with  baby     Plan:   Follow up in 2 week(s) for post partum visit, breast feeding check and post partum depression check.   Patient has appointment made for follow up in 2 weeks with JAYSON Lamas

## 2021-09-13 NOTE — LACTATION NOTE
Lactation education:    [x] Latch/ good latch vs shallow latch/ steps to obtaining deep latch    [x] How to know if infant is eating enough/ feedings per 24 hours, wet/dirty diapers    [x] Feeding/satiety cues      Lactation education resources given:     [x]  How to Breastfeed your baby - [address] publication      [x]  Information on feeding cues     [x]  Support group handout    [x]  Breastpump cleaning guidelines - [facility]     [x]  Breastfeeding & Safe Sleep handout - [address] publication    [x]  Calling All Dads! Handout - [address] publication    []  Breast and Nipple Care - [facility]     []  [address]    []  [place]    []  Going Back to Work - [facility]    []  Preventing Engorgement - [facility]    Supplies given:    []  Brush, soap and basin for breastpump cleaning    []  Insurance pump provided     []  [facility] pump set up for patient to use    Explained to patient, patient verbalizes understanding.         Signed:  Estela Crook RN, BSN, IBCLC

## 2021-09-13 NOTE — PROGRESS NOTES
Department of Obstetrics and Gynecology  Labor and Delivery       Post Partum Progress Note             SUBJECTIVE:  Doing well, sitting up in bed visiting with significant other. States she feels good and would like to go home today.      OBJECTIVE:      Vitals:  /62   Pulse 56   Temp 98.3 °F (36.8 °C)   Resp 16   Ht 5' 10\" (1.778 m)   Wt 234 lb (106.1 kg)   LMP 2020 (Approximate)   SpO2 98%   BMI 33.58 kg/m²   Patient Vitals for the past 24 hrs:   BP Temp Pulse Resp   21 0755 109/62 98.3 °F (36.8 °C) 56 16   21 0236 (!) 107/56 98.1 °F (36.7 °C) 65 16   21 1929 116/73 98.3 °F (36.8 °C) 62 14   21 1651 114/73 97.9 °F (36.6 °C) 67 16   21 1211 118/62  67 16         ABDOMEN: normal shape, position and consistency, normal size, non tender  GENITAL/URINARY:  External Genitalia:  General appearance; normal, Hair distribution; normal, Lesions absent  Uterus:  Size normal, Position normal, Tenderness absent, FF U/-1  Breast:normal appearance, no masses or tenderness  Cor: RRR no Murmurs  Pulmonary: clear to auscultation anterior and posterior  Extremities: no Clubbing cyanosis or ecchymosis    DATA:    29 yo G1Po  S/P - 1st degree lac repair  Breastfeeding   2nd day post partum       ASSESSMENT :      Active Problems:    Uterine contractions during pregnancy       (normal spontaneous vaginal delivery)        Plan:  Discharge patient home today with  baby  Follow up JAYSON Palmer in 2 weeks

## 2021-09-13 NOTE — FLOWSHEET NOTE
Discharge instructions reviewed with pt. Verb understanding of self and infant care and follow up visits. Denies further needs.

## 2021-09-13 NOTE — LACTATION NOTE
This note was copied from a baby's chart. Oral exam of infant indicates lingual frenulum that blanches and pops when tongue is gently lifted. Noted that infant's tongue lies near floor of mouth when she is crying. Lateral movement of tongue noted, unable to elicit forward movement of tongue. Slight thrust and snapback noted when infant is suckling on gloved finger. Mom states that infant cannot latch to breast without nipple shield. Noted that lips do not consistently flange outward with latch. Unable to manually flange upper lip. Jaw moves in a piston-like motion with suckling. Discussed findings and infant's increased weight loss with parents. Information given on signs and symptoms of oral ties, as well as options. Recommended first trying massage or craniosacral therapy to help with suck function. Parents verbalize understanding. Will follow up with LC if further assistance needed.

## 2021-09-16 NOTE — PROGRESS NOTES
[facility]/o [facility] back pain and feeling of tingling in bilateral legs at post-op visit, post epidural and delivery on 9/11/2021. Recommended follow up with neurologist for evaluation.

## 2022-01-31 PROBLEM — J34.3 HYPERTROPHY OF NASAL TURBINATES: Status: ACTIVE | Noted: 2020-08-07

## 2022-01-31 PROBLEM — J34.2 DEVIATED NASAL SEPTUM: Status: ACTIVE | Noted: 2020-08-07

## 2022-04-06 PROBLEM — F41.1 GENERALIZED ANXIETY DISORDER: Chronic | Status: ACTIVE | Noted: 2018-10-26

## 2023-09-14 PROBLEM — F41.9 ANXIETY: Chronic | Status: ACTIVE | Noted: 2023-09-14

## 2023-09-14 PROBLEM — L43.9 LICHEN PLANUS: Chronic | Status: ACTIVE | Noted: 2023-09-14

## 2024-05-15 PROBLEM — O47.9 UTERINE CONTRACTIONS DURING PREGNANCY: Status: RESOLVED | Noted: 2021-09-10 | Resolved: 2024-05-15

## 2024-07-11 ENCOUNTER — ANESTHESIA EVENT (OUTPATIENT)
Dept: LABOR AND DELIVERY | Age: 34
End: 2024-07-11
Payer: COMMERCIAL

## 2024-07-11 ENCOUNTER — HOSPITAL ENCOUNTER (OUTPATIENT)
Age: 34
Discharge: HOME | End: 2024-07-11
Attending: OBSTETRICS & GYNECOLOGY | Admitting: OBSTETRICS & GYNECOLOGY
Payer: COMMERCIAL

## 2024-07-11 ENCOUNTER — HOSPITAL ENCOUNTER (INPATIENT)
Age: 34
LOS: 2 days | Discharge: HOME | End: 2024-07-13
Attending: OBSTETRICS & GYNECOLOGY | Admitting: OBSTETRICS & GYNECOLOGY
Payer: COMMERCIAL

## 2024-07-11 ENCOUNTER — ANESTHESIA (OUTPATIENT)
Dept: LABOR AND DELIVERY | Age: 34
End: 2024-07-11
Payer: COMMERCIAL

## 2024-07-11 VITALS
DIASTOLIC BLOOD PRESSURE: 76 MMHG | OXYGEN SATURATION: 99 % | HEIGHT: 70 IN | HEART RATE: 65 BPM | WEIGHT: 229 LBS | TEMPERATURE: 97.8 F | RESPIRATION RATE: 18 BRPM | BODY MASS INDEX: 32.78 KG/M2 | SYSTOLIC BLOOD PRESSURE: 128 MMHG

## 2024-07-11 PROBLEM — Z78.9 ADMITTED TO LABOR AND DELIVERY: Status: ACTIVE | Noted: 2024-07-11

## 2024-07-11 LAB
ABO + RH BLD: NORMAL
ARM BAND NUMBER: NORMAL
BASOPHILS # BLD: 0.04 K/UL (ref 0–0.2)
BASOPHILS NFR BLD: 0 % (ref 0–2)
BLOOD BANK SAMPLE EXPIRATION: NORMAL
BLOOD GROUP ANTIBODIES SERPL: NEGATIVE
EOSINOPHIL # BLD: 0.1 K/UL (ref 0–0.44)
EOSINOPHILS RELATIVE PERCENT: 1 % (ref 1–4)
ERYTHROCYTE [DISTWIDTH] IN BLOOD BY AUTOMATED COUNT: 14.2 % (ref 11.8–14.4)
HCT VFR BLD AUTO: 38.5 % (ref 36.3–47.1)
HGB BLD-MCNC: 12.9 G/DL (ref 11.9–15.1)
IMM GRANULOCYTES # BLD AUTO: 0.08 K/UL (ref 0–0.3)
IMM GRANULOCYTES NFR BLD: 1 %
LYMPHOCYTES NFR BLD: 3.73 K/UL (ref 1.1–3.7)
LYMPHOCYTES RELATIVE PERCENT: 29 % (ref 24–43)
MCH RBC QN AUTO: 29.5 PG (ref 25.2–33.5)
MCHC RBC AUTO-ENTMCNC: 33.5 G/DL (ref 28.4–34.8)
MCV RBC AUTO: 87.9 FL (ref 82.6–102.9)
MONOCYTES NFR BLD: 0.84 K/UL (ref 0.1–1.2)
MONOCYTES NFR BLD: 7 % (ref 3–12)
NEUTROPHILS NFR BLD: 62 % (ref 36–65)
NEUTS SEG NFR BLD: 8.04 K/UL (ref 1.5–8.1)
NRBC BLD-RTO: 0 PER 100 WBC
PLATELET # BLD AUTO: 225 K/UL (ref 138–453)
PMV BLD AUTO: 11.2 FL (ref 8.1–13.5)
RBC # BLD AUTO: 4.38 M/UL (ref 3.95–5.11)
WBC OTHER # BLD: 12.8 K/UL (ref 3.5–11.3)

## 2024-07-11 PROCEDURE — 0KQM0ZZ REPAIR PERINEUM MUSCLE, OPEN APPROACH: ICD-10-PCS | Performed by: OBSTETRICS & GYNECOLOGY

## 2024-07-11 PROCEDURE — 86850 RBC ANTIBODY SCREEN: CPT

## 2024-07-11 PROCEDURE — 59409 OBSTETRICAL CARE: CPT | Performed by: OBSTETRICS & GYNECOLOGY

## 2024-07-11 PROCEDURE — 59025 FETAL NON-STRESS TEST: CPT

## 2024-07-11 PROCEDURE — 3700000025 EPIDURAL BLOCK: Performed by: NURSE ANESTHETIST, CERTIFIED REGISTERED

## 2024-07-11 PROCEDURE — 85025 COMPLETE CBC W/AUTO DIFF WBC: CPT

## 2024-07-11 PROCEDURE — 36415 COLL VENOUS BLD VENIPUNCTURE: CPT

## 2024-07-11 PROCEDURE — 10907ZC DRAINAGE OF AMNIOTIC FLUID, THERAPEUTIC FROM PRODUCTS OF CONCEPTION, VIA NATURAL OR ARTIFICIAL OPENING: ICD-10-PCS | Performed by: OBSTETRICS & GYNECOLOGY

## 2024-07-11 PROCEDURE — 0UQMXZZ REPAIR VULVA, EXTERNAL APPROACH: ICD-10-PCS | Performed by: OBSTETRICS & GYNECOLOGY

## 2024-07-11 PROCEDURE — 2500000003 HC RX 250 WO HCPCS: Performed by: NURSE ANESTHETIST, CERTIFIED REGISTERED

## 2024-07-11 PROCEDURE — 86901 BLOOD TYPING SEROLOGIC RH(D): CPT

## 2024-07-11 PROCEDURE — 86900 BLOOD TYPING SEROLOGIC ABO: CPT

## 2024-07-11 PROCEDURE — 6360000002 HC RX W HCPCS: Performed by: NURSE ANESTHETIST, CERTIFIED REGISTERED

## 2024-07-11 PROCEDURE — 1220000000 HC SEMI PRIVATE OB R&B

## 2024-07-11 PROCEDURE — 99212 OFFICE O/P EST SF 10 MIN: CPT

## 2024-07-11 RX ORDER — ONDANSETRON 2 MG/ML
4 INJECTION INTRAMUSCULAR; INTRAVENOUS EVERY 6 HOURS PRN
Status: DISCONTINUED | OUTPATIENT
Start: 2024-07-11 | End: 2024-07-13 | Stop reason: HOSPADM

## 2024-07-11 RX ORDER — CARBOPROST TROMETHAMINE 250 UG/ML
250 INJECTION, SOLUTION INTRAMUSCULAR PRN
Status: DISCONTINUED | OUTPATIENT
Start: 2024-07-11 | End: 2024-07-11

## 2024-07-11 RX ORDER — SIMETHICONE 80 MG
80 TABLET,CHEWABLE ORAL EVERY 6 HOURS PRN
Status: DISCONTINUED | OUTPATIENT
Start: 2024-07-11 | End: 2024-07-13 | Stop reason: HOSPADM

## 2024-07-11 RX ORDER — DOCUSATE SODIUM 100 MG/1
100 CAPSULE, LIQUID FILLED ORAL 2 TIMES DAILY PRN
Status: DISCONTINUED | OUTPATIENT
Start: 2024-07-11 | End: 2024-07-13 | Stop reason: HOSPADM

## 2024-07-11 RX ORDER — SODIUM CHLORIDE 0.9 % (FLUSH) 0.9 %
5-40 SYRINGE (ML) INJECTION EVERY 12 HOURS SCHEDULED
Status: DISCONTINUED | OUTPATIENT
Start: 2024-07-11 | End: 2024-07-13 | Stop reason: HOSPADM

## 2024-07-11 RX ORDER — MODIFIED LANOLIN
OINTMENT (GRAM) TOPICAL PRN
Status: DISCONTINUED | OUTPATIENT
Start: 2024-07-11 | End: 2024-07-13 | Stop reason: HOSPADM

## 2024-07-11 RX ORDER — ONDANSETRON 2 MG/ML
4 INJECTION INTRAMUSCULAR; INTRAVENOUS EVERY 6 HOURS PRN
Status: DISCONTINUED | OUTPATIENT
Start: 2024-07-11 | End: 2024-07-11

## 2024-07-11 RX ORDER — ONDANSETRON 4 MG/1
4 TABLET, ORALLY DISINTEGRATING ORAL EVERY 6 HOURS PRN
Status: DISCONTINUED | OUTPATIENT
Start: 2024-07-11 | End: 2024-07-13 | Stop reason: HOSPADM

## 2024-07-11 RX ORDER — LIDOCAINE HYDROCHLORIDE 20 MG/ML
INJECTION, SOLUTION INFILTRATION; PERINEURAL PRN
Status: DISCONTINUED | OUTPATIENT
Start: 2024-07-11 | End: 2024-07-11 | Stop reason: SDUPTHER

## 2024-07-11 RX ORDER — ROPIVACAINE HYDROCHLORIDE 2 MG/ML
8 INJECTION, SOLUTION EPIDURAL; INFILTRATION; PERINEURAL CONTINUOUS
Status: DISCONTINUED | OUTPATIENT
Start: 2024-07-11 | End: 2024-07-11

## 2024-07-11 RX ORDER — ROPIVACAINE HYDROCHLORIDE 2 MG/ML
INJECTION, SOLUTION EPIDURAL; INFILTRATION; PERINEURAL CONTINUOUS PRN
Status: DISCONTINUED | OUTPATIENT
Start: 2024-07-11 | End: 2024-07-11 | Stop reason: SDUPTHER

## 2024-07-11 RX ORDER — SODIUM CHLORIDE 0.9 % (FLUSH) 0.9 %
5-40 SYRINGE (ML) INJECTION PRN
Status: DISCONTINUED | OUTPATIENT
Start: 2024-07-11 | End: 2024-07-11

## 2024-07-11 RX ORDER — SODIUM CHLORIDE 0.9 % (FLUSH) 0.9 %
5-40 SYRINGE (ML) INJECTION EVERY 12 HOURS SCHEDULED
Status: DISCONTINUED | OUTPATIENT
Start: 2024-07-11 | End: 2024-07-11

## 2024-07-11 RX ORDER — SODIUM CHLORIDE, SODIUM LACTATE, POTASSIUM CHLORIDE, CALCIUM CHLORIDE 600; 310; 30; 20 MG/100ML; MG/100ML; MG/100ML; MG/100ML
INJECTION, SOLUTION INTRAVENOUS CONTINUOUS
Status: DISCONTINUED | OUTPATIENT
Start: 2024-07-11 | End: 2024-07-11

## 2024-07-11 RX ORDER — SODIUM CHLORIDE 9 MG/ML
INJECTION, SOLUTION INTRAVENOUS PRN
Status: DISCONTINUED | OUTPATIENT
Start: 2024-07-11 | End: 2024-07-11

## 2024-07-11 RX ORDER — TERBUTALINE SULFATE 1 MG/ML
0.25 INJECTION, SOLUTION SUBCUTANEOUS
Status: DISCONTINUED | OUTPATIENT
Start: 2024-07-11 | End: 2024-07-11

## 2024-07-11 RX ORDER — EPHEDRINE SULFATE/0.9% NACL/PF 25 MG/5 ML
5 SYRINGE (ML) INTRAVENOUS EVERY 5 MIN PRN
Status: DISCONTINUED | OUTPATIENT
Start: 2024-07-11 | End: 2024-07-11

## 2024-07-11 RX ORDER — SODIUM CHLORIDE, SODIUM LACTATE, POTASSIUM CHLORIDE, AND CALCIUM CHLORIDE .6; .31; .03; .02 G/100ML; G/100ML; G/100ML; G/100ML
500 INJECTION, SOLUTION INTRAVENOUS PRN
Status: DISCONTINUED | OUTPATIENT
Start: 2024-07-11 | End: 2024-07-11

## 2024-07-11 RX ORDER — ASPIRIN 81 MG/1
81 TABLET, CHEWABLE ORAL DAILY
COMMUNITY

## 2024-07-11 RX ORDER — NALOXONE HYDROCHLORIDE 0.4 MG/ML
INJECTION, SOLUTION INTRAMUSCULAR; INTRAVENOUS; SUBCUTANEOUS PRN
Status: DISCONTINUED | OUTPATIENT
Start: 2024-07-11 | End: 2024-07-11

## 2024-07-11 RX ORDER — EPHEDRINE SULFATE/0.9% NACL/PF 50 MG/5 ML
5 SYRINGE (ML) INTRAVENOUS EVERY 5 MIN PRN
Status: DISCONTINUED | OUTPATIENT
Start: 2024-07-11 | End: 2024-07-11

## 2024-07-11 RX ORDER — SODIUM CHLORIDE 9 MG/ML
INJECTION, SOLUTION INTRAVENOUS PRN
Status: DISCONTINUED | OUTPATIENT
Start: 2024-07-11 | End: 2024-07-13 | Stop reason: HOSPADM

## 2024-07-11 RX ORDER — SODIUM CHLORIDE, SODIUM LACTATE, POTASSIUM CHLORIDE, AND CALCIUM CHLORIDE .6; .31; .03; .02 G/100ML; G/100ML; G/100ML; G/100ML
1000 INJECTION, SOLUTION INTRAVENOUS PRN
Status: DISCONTINUED | OUTPATIENT
Start: 2024-07-11 | End: 2024-07-11

## 2024-07-11 RX ORDER — ACETAMINOPHEN 500 MG
1000 TABLET ORAL EVERY 8 HOURS SCHEDULED
Status: DISCONTINUED | OUTPATIENT
Start: 2024-07-11 | End: 2024-07-13 | Stop reason: HOSPADM

## 2024-07-11 RX ORDER — ONDANSETRON 4 MG/1
4 TABLET, ORALLY DISINTEGRATING ORAL EVERY 6 HOURS PRN
Status: DISCONTINUED | OUTPATIENT
Start: 2024-07-11 | End: 2024-07-11

## 2024-07-11 RX ORDER — SEVOFLURANE 250 ML/250ML
1 LIQUID RESPIRATORY (INHALATION) CONTINUOUS PRN
Status: DISCONTINUED | OUTPATIENT
Start: 2024-07-11 | End: 2024-07-11

## 2024-07-11 RX ORDER — EPHEDRINE SULFATE 50 MG/ML
5 INJECTION, SOLUTION INTRAVENOUS EVERY 5 MIN PRN
Status: DISCONTINUED | OUTPATIENT
Start: 2024-07-11 | End: 2024-07-11

## 2024-07-11 RX ORDER — IBUPROFEN 800 MG/1
800 TABLET ORAL EVERY 8 HOURS SCHEDULED
Status: DISCONTINUED | OUTPATIENT
Start: 2024-07-11 | End: 2024-07-13 | Stop reason: HOSPADM

## 2024-07-11 RX ORDER — NALBUPHINE HYDROCHLORIDE 10 MG/ML
10 INJECTION, SOLUTION INTRAMUSCULAR; INTRAVENOUS; SUBCUTANEOUS
Status: DISCONTINUED | OUTPATIENT
Start: 2024-07-11 | End: 2024-07-11

## 2024-07-11 RX ORDER — METHYLERGONOVINE MALEATE 0.2 MG/ML
200 INJECTION INTRAVENOUS PRN
Status: DISCONTINUED | OUTPATIENT
Start: 2024-07-11 | End: 2024-07-11

## 2024-07-11 RX ORDER — ACETAMINOPHEN 325 MG/1
650 TABLET ORAL EVERY 4 HOURS PRN
Status: DISCONTINUED | OUTPATIENT
Start: 2024-07-11 | End: 2024-07-11

## 2024-07-11 RX ORDER — LIDOCAINE HYDROCHLORIDE 10 MG/ML
30 INJECTION, SOLUTION EPIDURAL; INFILTRATION; INTRACAUDAL; PERINEURAL PRN
Status: DISCONTINUED | OUTPATIENT
Start: 2024-07-11 | End: 2024-07-11

## 2024-07-11 RX ORDER — SODIUM CHLORIDE 0.9 % (FLUSH) 0.9 %
5-40 SYRINGE (ML) INJECTION PRN
Status: DISCONTINUED | OUTPATIENT
Start: 2024-07-11 | End: 2024-07-13 | Stop reason: HOSPADM

## 2024-07-11 RX ADMIN — SODIUM CHLORIDE, SODIUM LACTATE, POTASSIUM CHLORIDE, CALCIUM CHLORIDE: 600; 310; 30; 20 INJECTION, SOLUTION INTRAVENOUS at 19:15

## 2024-07-11 RX ADMIN — LIDOCAINE HYDROCHLORIDE 2 ML: 20 INJECTION, SOLUTION INFILTRATION; PERINEURAL at 20:20

## 2024-07-11 RX ADMIN — SODIUM CHLORIDE, SODIUM LACTATE, POTASSIUM CHLORIDE, AND CALCIUM CHLORIDE 1000 ML: .6; .31; .03; .02 INJECTION, SOLUTION INTRAVENOUS at 20:00

## 2024-07-11 RX ADMIN — ROPIVACAINE HYDROCHLORIDE 8 ML/HR: 2 INJECTION, SOLUTION EPIDURAL; INFILTRATION at 20:25

## 2024-07-11 RX ADMIN — LIDOCAINE HYDROCHLORIDE 2 ML: 20 INJECTION, SOLUTION INFILTRATION; PERINEURAL at 20:10

## 2024-07-11 NOTE — PROGRESS NOTES
Pt states the dizziness is \"a lot better\" presently. Pt states the dizziness is not completely gone but is mild now.

## 2024-07-11 NOTE — PROGRESS NOTES
Writer updated Dr. Kim at this time about patient arrival, VS, C/O dizziness and ongoing contractions, EFM and SVE reviewed. Per Dr. Kim, pt is good to be discharged at this time.

## 2024-07-11 NOTE — DISCHARGE INSTRUCTIONS
OUTPATIENT DISCHARGE      Isidra Collette [facility]  [address] [facility] [place] [address]  [phone]      ACTIVITY LIMITATIONS:  (X  )Up and about as desired and tolerated  (  )Up to bathroom only  (  )Lay on either side  (  )Avoid heavy lifting or exercise  (  )No sex  (  )No nipple stimulation  (  )Complet bedrest  (  )Avoid using stairs  ( X )Increase fluids  **DRINK AT LEAST eight-8oz. Glasses of water daily.**    Call your Doctor if:  (  X)Contractions are every 5 minutes apart (from start of one to the start of the next contraction) lasting 60 seconds for at least 1 hour, strong enough you can not walk or talk through the contraction and regular.  ( X )Bag of water breaks  (  X)Vaginal bleeding  ( X )Unusual pain occurs  ( X )Decreased fetal movement  (  ) labor:  If you have 4 contractions in an hour    Keep your scheduled follow up appointment.      IN CASE OF EMERGENCY CONTACT LABOR AND DELIVERY [phone].

## 2024-07-11 NOTE — ANESTHESIA PRE PROCEDURE
Department of Anesthesiology  Preprocedure Note       Name:  Vane Hutchinson   Age:  34 y.o.  :  1990                                          MRN:  [id number]         Date:  2024      Surgeon: * No surgeons listed *    Procedure: * No procedures listed *    Medications prior to admission:   Prior to Admission medications    Medication Sig Start Date End Date Taking? Authorizing Provider   aspirin 81 MG chewable tablet Take 1 tablet by mouth daily    Prema Cosby MD   sertraline (ZOLOFT) 25 MG tablet TAKE ONE TABLET BY MOUTH EVERY DAY 24   Isidra Murdock APRN - CNM   ondansetron (ZOFRAN) 4 MG tablet TAKE ONE TABLET BY MOUTH EVERY DAY AS NEEDED FOR NAUSEA OR VOMITING  Patient not taking: Reported on 2024 3/13/24   Isidra Murdock APRN - CNM   Multiple Vitamins-Minerals (WOMENS MULTIVITAMIN PO) Take by mouth    Prema Cosby MD   lactulose (CHRONULAC) 10 GM/15ML solution Take 15 mLs by mouth every evening  Patient not taking: Reported on 2024   Isidra Murdock APRN - CNM   Cetirizine HCl (ZYRTEC ALLERGY PO) Take by mouth    Prema Cosby MD   fluticasone (FLONASE) 50 MCG/ACT nasal spray 1 spray by Each Nostril route daily  Patient not taking: Reported on 2024    Prema Cosby MD       Current medications:    No current facility-administered medications for this encounter.       Allergies:    Allergies   Allergen Reactions   • Cat Hair Extract Other (See Comments)     Periorbital swelling   • Dust Mite Extract      Periorbital swelling   • Tree Extract      Periorbital swelling       Problem List:    Patient Active Problem List   Diagnosis Code   • Chronic sinusitis, unspecified J32.9   • Generalized anxiety disorder F41.1   • Allergic rhinitis due to pollen J30.1   •  (normal spontaneous vaginal delivery) O80   • Deviated nasal septum J34.2   • Hypertrophy of nasal turbinates J34.3   • Anxiety F41.9   • Lichen planus L43.9       Past Medical History:

## 2024-07-11 NOTE — PROGRESS NOTES
Patient arrives from home, FOB accompanies her. Patient brought to Rm 208 per wheelchair from ER waiting room. Patient c/o dizziness this afternoon, states she has spent the day cleaning, picking up toys. Pt states she did eat lunch and has been drinking water today. Pt reports + fetal movement. Pt states she has been having a brownish blood discharge today. Pt states she has had contractions since 04:00 this morning, rates \"5\" on scale 1-10. Pt denies leaking of fluid. Pt denies pain or burning when urinating. Pt denies headache or blurred vision, denies epigastric pain. Pt states she takes zoloft and has not missed a dose. Pt saw Harish LOERA in the office 2 days ago and states she was 2cm at that time. Fetal marker button given and NST explained to patient, patient sameeruFausto

## 2024-07-11 NOTE — FLOWSHEET NOTE
Pt to ob unit from home. Pt  states that her ctx are stronger and pt is vocal through them, to bed, efm on. Plan of care discussed. Pt verb understanding.

## 2024-07-12 PROCEDURE — 7200000001 HC VAGINAL DELIVERY

## 2024-07-12 PROCEDURE — 6370000000 HC RX 637 (ALT 250 FOR IP): Performed by: OBSTETRICS & GYNECOLOGY

## 2024-07-12 PROCEDURE — 1220000000 HC SEMI PRIVATE OB R&B

## 2024-07-12 RX ADMIN — ACETAMINOPHEN 1000 MG: 500 TABLET ORAL at 14:07

## 2024-07-12 RX ADMIN — Medication: at 01:11

## 2024-07-12 RX ADMIN — IBUPROFEN 800 MG: 800 TABLET, FILM COATED ORAL at 01:12

## 2024-07-12 RX ADMIN — ACETAMINOPHEN 1000 MG: 500 TABLET ORAL at 22:26

## 2024-07-12 RX ADMIN — IBUPROFEN 800 MG: 800 TABLET, FILM COATED ORAL at 09:04

## 2024-07-12 RX ADMIN — ACETAMINOPHEN 1000 MG: 500 TABLET ORAL at 04:52

## 2024-07-12 RX ADMIN — WITCH HAZEL: 500 SOLUTION RECTAL; TOPICAL at 01:11

## 2024-07-12 RX ADMIN — IBUPROFEN 800 MG: 800 TABLET, FILM COATED ORAL at 17:00

## 2024-07-12 ASSESSMENT — PAIN DESCRIPTION - LOCATION
LOCATION: ABDOMEN;BACK
LOCATION: BACK
LOCATION: ABDOMEN

## 2024-07-12 ASSESSMENT — PAIN DESCRIPTION - DESCRIPTORS
DESCRIPTORS: CRAMPING
DESCRIPTORS: CRAMPING;ACHING
DESCRIPTORS: CRAMPING

## 2024-07-12 ASSESSMENT — PAIN SCALES - GENERAL
PAINLEVEL_OUTOF10: 3
PAINLEVEL_OUTOF10: 3
PAINLEVEL_OUTOF10: 4
PAINLEVEL_OUTOF10: 3
PAINLEVEL_OUTOF10: 2

## 2024-07-12 ASSESSMENT — PAIN DESCRIPTION - ORIENTATION
ORIENTATION: LOWER

## 2024-07-12 NOTE — LACTATION NOTE
LC consult this date for normal  consult. Infant sleeping soundly displaying minimal hunger cues. Mother state infant has been latching well and she feels when her milk lets down. Mother  states that she breast fed her other child and that her previous child had a tie. Mother concerned for a tie with current infant. LC will follow up for oral assessment as infant sleeping soundly.     Lactation education resources given:     [x]  How to Breastfeed your baby - [facility] publication      [x]  Follow up support information    [x]  Breast milk storage guidelines - CDC    [x]  Breastpump cleaning guidelines - CDC     [x]  Breastfeeding & Safe Sleep handout - [facility] publication    []  Calling All Dads! Handout - [facility] publication        Signed:  Svetlana Mosher RN, BSN, IBCLC

## 2024-07-12 NOTE — PLAN OF CARE
Problem: Vaginal Birth or  Section  Goal: Fetal and maternal status remain reassuring during the birth process  Description:  Birth OB-Pregnancy care plan goal which identifies if the fetal and maternal status remain reassuring during the birth process  2024 1020 by Lisa Hernandez RN  Outcome: Progressing  2024 by Abby Terrazas RN  Outcome: Progressing     Problem: Postpartum  Goal: Experiences normal postpartum course  Description:  Postpartum OB-Pregnancy care plan goal which identifies if the mother is experiencing a normal postpartum course  Outcome: Progressing  Goal: Appropriate maternal -  bonding  Description:  Postpartum OB-Pregnancy care plan goal which identifies if the mother and  are bonding appropriately  Outcome: Progressing  Goal: Establishment of infant feeding pattern  Description:  Postpartum OB-Pregnancy care plan goal which identifies if the mother is establishing a feeding pattern with their   Outcome: Progressing  Goal: Incisions, wounds, or drain sites healing without S/S of infection  Outcome: Progressing     Problem: Pain  Goal: Verbalizes/displays adequate comfort level or baseline comfort level  2024 1020 by Lisa Hernandez RN  Outcome: Progressing  2024 by Abby Terrazas RN  Outcome: Progressing     Problem: Safety - Adult  Goal: Free from fall injury  2024 1020 by Lisa Hernandez RN  Outcome: Progressing  2024 by Abby Terrazas RN  Outcome: Progressing     Problem: Discharge Planning  Goal: Discharge to home or other facility with appropriate resources  2024 1020 by Lisa Hernandez RN  Outcome: Progressing  2024 by Abby Terrazas RN  Outcome: Progressing     Problem: Chronic Conditions and Co-morbidities  Goal: Patient's chronic conditions and co-morbidity symptoms are monitored and maintained or improved  2024 1020 by Lisa Hernandez RN  Outcome: Progressing  2024

## 2024-07-12 NOTE — PROCEDURES
Department of Obstetrics and Gynecology  Spontaneous Vaginal Delivery Note      Pre-operative Diagnosis:  Normal labor, Term pregnancy    Post-operative Diagnosis:   1)  Same      2)  Viable male infant      3) 2nd degree and R vaginal wall laceration    Procedure: Spontaneous Vaginal Delivery       Discription of Procedure:    Vane achieved complete cervical dilatation at 2101. She labored down and the stated pushing at 2207. She pushed for approximately 2 minutes and was delivered via spontaneous vaginal delivery male infant. TOB was 2209. Delayed cord blood clamping was effected for 60 seconds. The placenta was delivered spontaneous at 2213 intact 3 vessel cord. Apagars were 9+9 at 1 and 5 minutes. Infant weight is pending. There was a 2nd degree perineal laceration and a R labial laceration that was repaired with 3-0 vicryl in the usual fashion. Pitocin was ran wide open and good hemostasis was assured.         Lacerations: None     Anesthesia: Epidural    Specimen:   1) Cord blood    2) Cord Segment     Blood loss: 250 cc      Condition:  Infant and mother stable in room  and mother stable      Attending Attestation: A qualified resident physician was not available.      Guicho Kim DO, MBA, FACOOG, FACOG  July 11, 2024 10:41 PM

## 2024-07-12 NOTE — LACTATION NOTE
LC follow up to complete oral assessment on infant. Mother agreeable to this.     Infant Exam:  General: Well cared for appearance   Behavior: Alert   [x] Active [] Quiet  [place]: Soft, Flat   Mucous Membranes: Moist   Skin: Clear   ENT: WNL   Mouth:  Upper lip: Blanches and notched with limited flanging noted; Kotlow class IV    Tongue lateralization: [x] Adequate [] Limited  Tongue protrusion: [x] Adequate [] Limited  Tongue position in mouth: min to low but decreased overall posterior elevation noted.  Lingual frenum: mid-anterior tie, limited elevation, thin; Kotlow class 2   Suck assessment: Overall rhythmic sucking, however, decreased posterior elevation and chomping noted   Neck: [x] WNL  [] Head/neck preference   Eyes: Clear   Respiratory: [x]  WNL    GI: [x] WNL   Musculoskeletal/Neuro: [x] WNL       recommended oral stretches to complete 6x/day, with diaper changes     Stretches include:   - tongue tug-of-war  -TMJ massae   -tongue lifts

## 2024-07-12 NOTE — ANESTHESIA PROCEDURE NOTES
Epidural Block    Patient location during procedure: OB  Start time: 7/11/2024 8:01 PM  End time: 7/11/2024 8:26 PM  Reason for block: labor epidural  Staffing  Resident/CRNA: Ainsley Lamas APRN - CRNA  Performed by: Ainsley Lamas APRN - CRNA  Authorized by: Ainsley Lamas APRN - CRNA    Epidural  Patient position: sitting  Prep: ChloraPrep  Patient monitoring: continuous pulse ox and frequent blood pressure checks  Approach: midline  Location: L4-5  Injection technique: MACI saline  Provider prep: mask and sterile gloves  Needle  Needle type: Tuohy   Needle gauge: 17 G  Needle length: 3.5 in  Needle insertion depth: 8 cm  Catheter type: side hole  Catheter size: 19 G  Catheter at skin depth: 15 cm  Test dose: negativeCatheter Secured: tegaderm and tape  Assessment  Sensory level: T6  Hemodynamics: stable  Attempts: 1  Outcomes: uncomplicated and patient tolerated procedure well  Preanesthetic Checklist  Completed: patient identified, IV checked, site marked, risks and benefits discussed, surgical/procedural consents, equipment checked, pre-op evaluation, timeout performed, anesthesia consent given, oxygen available, monitors applied/VS acknowledged, fire risk safety assessment completed and verbalized and blood product R/B/A discussed and consented

## 2024-07-12 NOTE — PROGRESS NOTES
Vane Hutchinson is here in L&D at 39w1d for: contractions, dizziness.      Maternal Vitals  Temp: 97.8 °F (36.6 °C)  Temp Source: Oral  BP: 128/76  Pulse: 65  Respirations: 18    Testing  Reason for NST: Other (Comment) (rule out labor; dizziness)  Explained test to patient: Yes    Findings  Baseline: 145 BPM  Baseline Classification: Normal  Variability: Moderate  Decelerations: None  Accelerations: Yes  Acoustic Stimulator: No  Fetal Movement: Yes  Multiple Gestation?: No  Uterine contractions/10 min.: 3  Interpretation: Reactive  Interpreted by: MAIDA Luna RN/ GALINA Sheehan RN  $Non Stress Test Charge : Yes             NST Time start:  1536   NST Time stop:   1556      NST is reactive. Quality of tracing is satisfactory.

## 2024-07-12 NOTE — FLOWSHEET NOTE
Discussed with pt about making followup appts for her and the baby she states that JEFRY chow cnm is not in the office today

## 2024-07-12 NOTE — ANESTHESIA POSTPROCEDURE EVALUATION
Department of Anesthesiology  Postprocedure Note    Patient: Vane Hutchinson  MRN: [id number]  YOB: 1990  Date of evaluation: 7/12/2024    Procedure Summary       Date: 07/11/24 Room / Location:     Anesthesia Start: 1947 Anesthesia Stop: 2209    Procedure: Labor Analgesia Diagnosis:     Scheduled Providers:  Responsible Provider: Ainsley Lamas APRN - CRNA    Anesthesia Type: epidural ASA Status: 2            Anesthesia Type: No value filed.    Alex Phase I:      Alex Phase II:      Anesthesia Post Evaluation    Patient location during evaluation: bedside  Patient participation: complete - patient participated  Level of consciousness: awake and alert  Airway patency: patent  Nausea & Vomiting: no nausea and no vomiting  Cardiovascular status: blood pressure returned to baseline and hemodynamically stable  Respiratory status: acceptable and room air  Hydration status: euvolemic  Comments: Pulse ox on patient during PNB placement and remain on pt post placement.  Pt alert after PNB placement.    Pain management: adequate and satisfactory to patient    No notable events documented.

## 2024-07-12 NOTE — FLOWSHEET NOTE
RN remained at bedside throughout pushing.  EFM continuously assessed.  Vaginal delivery of viable male infant.

## 2024-07-12 NOTE — FLOWSHEET NOTE
Patient tolerated epidural procedure well, times are as follows:  Procedure start:2001  Test Dose: 2007  HR at test dose:68

## 2024-07-12 NOTE — PROGRESS NOTES
Patient off unit in stable condition.    Departure Mode: with significant other. And her mother.    Mobility at Departure: ambulatory    Discharged to: private residence    Time of Discharge: 16:19

## 2024-07-12 NOTE — PROGRESS NOTES
Subjective:       34 y.o.   @ 39w1d    Postpartum Day 1: Vaginal Delivery on 24    The patient feels well. The patient denies emotional concerns. Pain is well controlled with current medications.The patient is ambulating well. The patient is tolerating a normal diet.    Objective:      Patient Vitals for the past 8 hrs:   BP Temp Temp src Pulse Resp SpO2   24 0759 (!) 115/59 98 °F (36.7 °C) Oral 69 16 99 %   24 0442 (!) 105/59 98.2 °F (36.8 °C) Oral 71 16 --     General:    alert, appears stated age, and cooperative   Bowel Sounds:  active   Lochia:  appropriate   Uterine Fundus:   Firm @ U-2   Perineum:  Intact   DVT Evaluation:  No evidence of DVT seen on physical exam.         Assessment:     Status post Vaginal Delivery.       Plan:     1.Routine postpartum care.  2. Discharge Planning initiated      Guicho Kim DO, MBA, FACOOG  2024

## 2024-07-12 NOTE — H&P
Obstetrical History and Physical        CHIEF COMPLAINT:  abdominal pain, contractions    HISTORY OF PRESENT ILLNESS:      The patient is a 34 y.o. female at 39w1d c/o of above  OB History          2    Para   1    Term   1       0    AB   0    Living   1         SAB   0    IAB   0    Ectopic   0    Molar   0    Multiple        Live Births                Patient presents with a chief complaint as above and is being admitted for active phase labor  Denies DFM/VB/LOF/HA/EpigastricPain/Visual changes     Estimated Due Date: Estimated Date of Delivery: 24        PRENATAL CARE:  Pregnancy Complications:   None      PAST OB HISTORY:  OB History    Para Term  AB Living   2 1 1 0 0 1   SAB IAB Ectopic Molar Multiple Live Births   0 0 0 0          # Outcome Date GA Lbr Roddy/2nd Weight Sex Delivery Anes PTL Lv   2 Current            1 Term                   Past Medical History:        Diagnosis Date    Allergic rhinitis     Anxiety     COVID 2021    Environmental allergies     Lichen planus-like dermatitis     Mouth     Past Surgical History:        Procedure Laterality Date    SINUS SURGERY  2020    TONSILLECTOMY AND ADENOIDECTOMY      WISDOM TOOTH EXTRACTION       Allergies:    Cat hair extract, Dust mite extract, and Tree extract  Social History:    Social History     Socioeconomic History    Marital status:      Spouse name: Not on file    Number of children: Not on file    Years of education: Not on file    Highest education level: Not on file   Occupational History    Not on file   Tobacco Use    Smoking status: Never    Smokeless tobacco: Never   Vaping Use    Vaping Use: Never used   Substance and Sexual Activity    Alcohol use: Not Currently    Drug use: Never    Sexual activity: Yes     Partners: Male   Other Topics Concern    Not on file   Social History Narrative    Not on file     Social Determinants of Health     Financial Resource Strain: Not on file   Food     Presentation: Vertex  by exam        ASSESSMENT AND PLAN:    1. IUP at 39w1d  2. Active phase labor    Admission: Admit to L&D   FHR:  Category 1  Cel[place]: not indicated  Pain control plan: desires epidural  Delivery Plan: expectant   GBS: negative, No indication for GBS prophylaxis  LARC: Cl Kim DO, MBA, FACOOG, FACOG  2024

## 2024-07-13 VITALS
HEART RATE: 64 BPM | DIASTOLIC BLOOD PRESSURE: 73 MMHG | TEMPERATURE: 98.6 F | SYSTOLIC BLOOD PRESSURE: 117 MMHG | RESPIRATION RATE: 16 BRPM | OXYGEN SATURATION: 99 %

## 2024-07-13 PROCEDURE — 6370000000 HC RX 637 (ALT 250 FOR IP): Performed by: OBSTETRICS & GYNECOLOGY

## 2024-07-13 RX ORDER — IBUPROFEN 800 MG/1
800 TABLET ORAL EVERY 8 HOURS SCHEDULED
Qty: 120 TABLET | Refills: 3 | Status: SHIPPED | OUTPATIENT
Start: 2024-07-13

## 2024-07-13 RX ORDER — PSEUDOEPHEDRINE HCL 30 MG
100 TABLET ORAL 2 TIMES DAILY PRN
Qty: 30 CAPSULE | Refills: 1 | Status: SHIPPED | OUTPATIENT
Start: 2024-07-13

## 2024-07-13 RX ADMIN — IBUPROFEN 800 MG: 800 TABLET, FILM COATED ORAL at 05:36

## 2024-07-13 ASSESSMENT — PAIN DESCRIPTION - DESCRIPTORS: DESCRIPTORS: SORE

## 2024-07-13 ASSESSMENT — PAIN SCALES - GENERAL: PAINLEVEL_OUTOF10: 4

## 2024-07-13 ASSESSMENT — PAIN DESCRIPTION - ORIENTATION: ORIENTATION: LOWER

## 2024-07-13 ASSESSMENT — PAIN DESCRIPTION - LOCATION: LOCATION: BACK

## 2024-07-13 NOTE — PLAN OF CARE
Problem: Postpartum  Goal: Experiences normal postpartum course  Description:  Postpartum OB-Pregnancy care plan goal which identifies if the mother is experiencing a normal postpartum course  2024 by Laila Tomlin RN  Outcome: Progressing  2024 102 by Lisa Hernandez RN  Outcome: Progressing  Goal: Appropriate maternal -  bonding  Description:  Postpartum OB-Pregnancy care plan goal which identifies if the mother and  are bonding appropriately  2024 by Laila Tomlin RN  Outcome: Progressing  2024 1020 by Lisa Hernandez RN  Outcome: Progressing  Goal: Establishment of infant feeding pattern  Description:  Postpartum OB-Pregnancy care plan goal which identifies if the mother is establishing a feeding pattern with their   2024 by Laila Tomlin RN  Outcome: Progressing  2024 by Lisa Hernandez RN  Outcome: Progressing  Goal: Incisions, wounds, or drain sites healing without S/S of infection  2024 by Laila Tomlin RN  Outcome: Progressing  2024 1020 by Lisa Hernandez RN  Outcome: Progressing     Problem: Pain  Goal: Verbalizes/displays adequate comfort level or baseline comfort level  2024 by Laila Tomlin RN  Outcome: Progressing  Flowsheets (Taken 2024)  Verbalizes/displays adequate comfort level or baseline comfort level:   Encourage patient to monitor pain and request assistance   Assess pain using appropriate pain scale   Administer analgesics based on type and severity of pain and evaluate response   Implement non-pharmacological measures as appropriate and evaluate response   Consider cultural and social influences on pain and pain management   Notify Licensed Independent Practitioner if interventions unsuccessful or patient reports new pain  2024 102 by Lisa Hernandez RN  Outcome: Progressing     Problem: Infection - Adult  Goal: Absence of infection at

## 2024-07-13 NOTE — PLAN OF CARE
Problem: Vaginal Birth or  Section  Goal: Fetal and maternal status remain reassuring during the birth process  Description:  Birth OB-Pregnancy care plan goal which identifies if the fetal and maternal status remain reassuring during the birth process  2024 by Laila Tomlin RN  Outcome: Completed     Problem: Postpartum  Goal: Experiences normal postpartum course  Description:  Postpartum OB-Pregnancy care plan goal which identifies if the mother is experiencing a normal postpartum course  2024 by Lisa Hernandez RN  Outcome: Progressing  2024 by Laila Tomlin RN  Outcome: Progressing  Goal: Appropriate maternal -  bonding  Description:  Postpartum OB-Pregnancy care plan goal which identifies if the mother and  are bonding appropriately  2024 by Lisa Hernandez RN  Outcome: Progressing  2024 by Laila Tomlin RN  Outcome: Progressing  Goal: Establishment of infant feeding pattern  Description:  Postpartum OB-Pregnancy care plan goal which identifies if the mother is establishing a feeding pattern with their   2024 by Lisa Hernandez RN  Outcome: Progressing  2024 by Laila Tomlin RN  Outcome: Progressing  Goal: Incisions, wounds, or drain sites healing without S/S of infection  2024 by Lisa Hernandez RN  Outcome: Progressing  2024 by Laila Tomlin RN  Outcome: Progressing     Problem: Pain  Goal: Verbalizes/displays adequate comfort level or baseline comfort level  2024 by Lisa Hernandez RN  Outcome: Progressing  2024 by Laila Tomlin RN  Outcome: Progressing  Flowsheets (Taken 2024)  Verbalizes/displays adequate comfort level or baseline comfort level:   Encourage patient to monitor pain and request assistance   Assess pain using appropriate pain scale   Administer analgesics based on type and severity of pain and  evaluate response   Implement non-pharmacological measures as appropriate and evaluate response   Consider cultural and social influences on pain and pain management   Notify Licensed Independent Practitioner if interventions unsuccessful or patient reports new pain     Problem: Infection - Adult  Goal: Absence of infection at discharge  7/13/2024 1027 by Lisa Hernandez RN  Outcome: Progressing  7/13/2024 0008 by Laila Tomlin RN  Outcome: Progressing  Goal: Absence of infection during hospitalization  7/13/2024 1027 by Lisa Hernandez RN  Outcome: Progressing  7/13/2024 0008 by Laila Tomlin RN  Outcome: Progressing  Goal: Absence of fever/infection during anticipated neutropenic period  7/13/2024 1027 by Lisa Hernandez RN  Outcome: Progressing  7/13/2024 0008 by Laila Tomlin RN  Outcome: Progressing     Problem: Safety - Adult  Goal: Free from fall injury  7/13/2024 1027 by Lisa Hernandez RN  Outcome: Progressing  7/13/2024 0008 by Laila Tomlin RN  Outcome: Progressing     Problem: Discharge Planning  Goal: Discharge to home or other facility with appropriate resources  7/13/2024 1027 by Lisa Hernandez RN  Outcome: Progressing  7/13/2024 0008 by Laila Tomlin RN  Outcome: Progressing     Problem: Chronic Conditions and Co-morbidities  Goal: Patient's chronic conditions and co-morbidity symptoms are monitored and maintained or improved  7/13/2024 1027 by Lisa Hernandez RN  Outcome: Progressing  7/13/2024 0008 by Laila Tomlin RN  Outcome: Progressing

## 2024-07-13 NOTE — PROGRESS NOTES
Subjective:       34 y.o.   @ 39w1d    Postpartum Day 2: Vaginal Delivery on 24    The patient feels well. The patient denies emotional concerns. Pain is well controlled with current medications.The patient is ambulating well. The patient is tolerating a normal diet.    Objective:      Patient Vitals for the past 8 hrs:   BP Temp Temp src Pulse Resp SpO2   24 0734 117/73 98.6 °F (37 °C) Oral 64 16 99 %   24 0601 106/70 97.3 °F (36.3 °C) Oral 61 16 --       General:    alert, appears stated age, and cooperative   Bowel Sounds:  active   Lochia:  appropriate   Uterine Fundus:   Firm @ U-2   Perineum:  Intact   DVT Evaluation:  No evidence of DVT seen on physical exam.         Assessment:     Status post Vaginal Delivery.       Plan:     1.Routine postpartum care.  2. Discharge Planning initiated      Guicho Kim DO, MBA, FACOOG  2024

## 2024-07-13 NOTE — PLAN OF CARE
Problem: Vaginal Birth or  Section  Goal: Fetal and maternal status remain reassuring during the birth process  Description:  Birth OB-Pregnancy care plan goal which identifies if the fetal and maternal status remain reassuring during the birth process  2024 000 by Laila Tomlin RN  Outcome: Completed     Problem: Postpartum  Goal: Experiences normal postpartum course  Description:  Postpartum OB-Pregnancy care plan goal which identifies if the mother is experiencing a normal postpartum course  2024 1043 by Lisa Hernandez RN  Outcome: Completed  2024 1027 by Lisa Hernandez RN  Outcome: Progressing  2024 000 by Laila Tomlin RN  Outcome: Progressing  Goal: Appropriate maternal -  bonding  Description:  Postpartum OB-Pregnancy care plan goal which identifies if the mother and  are bonding appropriately  2024 1043 by Lisa Hernandez RN  Outcome: Completed  2024 1027 by Lisa Hernandez RN  Outcome: Progressing  2024 by Laila Tomlin RN  Outcome: Progressing  Goal: Establishment of infant feeding pattern  Description:  Postpartum OB-Pregnancy care plan goal which identifies if the mother is establishing a feeding pattern with their   2024 1043 by Lisa Hernandez RN  Outcome: Completed  2024 1027 by Lisa Hernandez RN  Outcome: Progressing  2024 by Laila Tomlin RN  Outcome: Progressing  Goal: Incisions, wounds, or drain sites healing without S/S of infection  2024 1043 by Lisa Hernandez RN  Outcome: Completed  2024 1027 by Lisa Hernandez RN  Outcome: Progressing  2024 000 by Laila Tomlin RN  Outcome: Progressing     Problem: Pain  Goal: Verbalizes/displays adequate comfort level or baseline comfort level  2024 1043 by Lisa Hernandez RN  Outcome: Completed  2024 1027 by Lisa Hernandez RN  Outcome: Progressing  2024 by Nabor

## 2024-07-13 NOTE — L&D DELIVERY NOTE
HutchinsonJosef [[id number]]      Labor Events     Labor: No   Steroids: None  Cervical Ripening Date/Time:      Antibiotics Received during Labor: No  Rupture Date/Time:  24 22:01:00   Rupture Type: AROM, Intact  Fluid Color: Meconium  Meconium Consistency: Thin  Fluid Odor: None  Fluid Volume: None  Augmentation: AROM  Labor Complications: Meconium at birth              Anesthesia    Method: Epidural       Labor Event Times      Labor onset date/time:        Dilation complete date/time:  24 21:01:00 EDT     Start pushing date/time:  2024 22:07:00   Decision date/time (emergent ):            Labor Length    2nd stage: 1h 08m  3rd stage: 0h 04m       Delivery Details      Delivery Date: 24 Delivery Time: 22:09:00   Delivery Type: Vaginal, Spontaneous              [place] Presentation    Presentation: Vertex       Shoulder Dystocia    Shoulder Dystocia Present?: No       Assisted Delivery Details    Forceps Attempted?: No  Vacuum Extractor Attempted?: No                           Cord    Vessels: 3 Vessels  Complications: Knot  Delayed Cord Clamping?: Yes  Cord Clamped Date/Time: 2024 22:10:00  Cord Blood Disposition: Lab  Gases Sent?: No   Cord Comments:  PROCEDURAL - OBSTETRIC - CORD OBSERVATION   true knot in cord             Placenta    Date/Time: 2024 22:13:00  Removal: Spontaneous  Appearance: Intact  Disposition: Discarded       Lacerations    Episiotomy: None  Perineal Lacerations: 2nd  Other Lacerations: labial laceration  Labial Laceration: right           Vaginal Counts    Initial Count Personnel: SASHA BRANTLEY CST  Initial Count Verified By: MAIDA SY RN  Intial Sponge Count: Correct Intial Needles Count: Correct Intial Instruments Count: Correct   Final Sponges Count: Correct Final Needles  Count: Correct Final Instruments Count: Correct   Final Count Personnel: SASHA BRANTLEY CST  Final Count Verified By: DR. CHOW OB  Accurate Final Count?: Yes        Blood Loss  Mother: Vane Hutchinson R #[id number]     Start of Mother's Information      Delivery Blood Loss  24 1009 - 24 2209      Quantitative Blood Loss (mL) Hospital Encounter 150 grams    Total  150 mL               End of Mother's Information  Mother: Vane Hutchinson R #[id number]                Delivery Providers    Delivering clinician: Guicoh Kim DO     Provider Role     Obstetrician    Abby Terrazas, EARNESTINE Primary Nurse    Jojo Mae RN Primary  Nurse     NICU Nurse     Neonatologist     Anesthesiologist     Nurse Anesthetist     Nurse Practitioner     Midwife     Nursery Nurse     Respiratory Therapist     Scrub Tech     Assistant Surgeon    Leyla Perez                Assessment    Living Status: Living  Delivery Location Comment: 208        Skin Color:   Heart Rate:   Reflex Irritability:   Muscle Tone:   Respiratory Effort:   Total:            1 Minute:    1    2    2    2    2    9         5 Minute:    1    2    2    2    2    9                                        Apgars Assigned By: MAIDA TERRAZAS RN              Resuscitation    Method: Bulb Suction, Stimulation              Measurements      Birth Weight: 3572 g   Birth Length: 51.4 cm     Head Circumference: 35.6 cm              Skin to Skin      Skin to Skin Initiation Date/Time: 24 22:09:00 EDT     Skin to Skin With: Mother     Breastfeeding Initiated Date/Time: 2024 22:35:00                    Guicho Kim DO, DEJAH, FACOOG, FACOG  2024 12:10 PM

## 2024-07-13 NOTE — FLOWSHEET NOTE
Patient off unit in stable condition.    Departure Mode: with significant other.    Mobility at Departure: ambulatory    Discharged to: private residence    Time of Discharge: 1206

## 2024-07-13 NOTE — DISCHARGE SUMMARY
Obstetric Discharge Summary    Reasons for Admission on 2024  7:01 PM  Onset of Labor    Prenatal Procedures  None    Intrapartum Procedures  Vaginal Delivery    Postpartum Procedures  None    [place] Data  Information for the patient's :  Josef Hutchinson [[id number]]   male   Birth Weight: 3.572 kg (7 lb 14 oz)   Discharge With Mother  Complications: No    Discharge Diagnosis  Patient Active Problem List    Diagnosis Date Noted    Admitted to labor and delivery 2024    Anxiety 2023    Lichen planus 2023     (normal spontaneous vaginal delivery)     Deviated nasal septum 2020    Hypertrophy of nasal turbinates 2020    Chronic sinusitis, unspecified 2020    Allergic rhinitis due to pollen 2020    Generalized anxiety disorder 10/26/2018       Discharge Information  Current Discharge Medication List        START taking these medications    Details   ibuprofen (ADVIL;MOTRIN) 800 MG tablet Take 1 tablet by mouth every 8 hours  Qty: 120 tablet, Refills: 3      docusate sodium (COLACE, DULCOLAX) 100 MG CAPS Take 100 mg by mouth 2 times daily as needed for Constipation  Qty: 30 capsule, Refills: 1           CONTINUE these medications which have NOT CHANGED    Details   aspirin 81 MG chewable tablet Take 1 tablet by mouth daily      sertraline (ZOLOFT) 25 MG tablet TAKE ONE TABLET BY MOUTH EVERY DAY  Qty: 30 tablet, Refills: 6    Comments: This prescription was filled on 2024. Any refills authorized will be placed on file.  Associated Diagnoses: Anxiety      Multiple Vitamins-Minerals (WOMENS MULTIVITAMIN PO) Take by mouth      Cetirizine HCl (ZYRTEC ALLERGY PO) Take by mouth           STOP taking these medications       ondansetron (ZOFRAN) 4 MG tablet Comments:   Reason for Stopping:         lactulose (CHRONULAC) 10 GM/15ML solution Comments:   Reason for Stopping:         fluticasone (FLONASE) 50 MCG/ACT nasal spray Comments:   Reason for Stopping:

## 2024-07-13 NOTE — DISCHARGE INSTRUCTIONS
as recommended by your doctor or midwife for pain  If you develop a warm, red, tender area on your breast or develop a fever contact your OB provider.     For breastfeeding moms:  If you become engorged, feeding may be more difficult or painful for 1-2 days.  You may find it helpful to hand express some milk so that the infant can latch on more easily.   While breastfeeding, continue to take your prenatal vitamins as directed by your doctor or midwife.   Refer to the breastfeeding booklet in the  folder/binder for more information.  If you feel you need more assistance or have questions, please call Svetlana Mosher IBCLC, lactation consultant, at [phone] or the OB department to schedule an appointment or phone consultation.  For more FREE breastfeeding help, visit the Breastfeeding Support Group on Monday evenings at 7 pm in the OB department.      For NON-breastfeeding moms:  You may apply ice packs to your breasts over your bra for twenty minutes at a time for comfort.  Avoid stimulation to your breasts, when showering allow the water to strike your back not your breasts.    Wear a good fitting bra until your milk dries, such as a sports bra.    VITALIY CARE  Use the vitaliy-bottle after toileting until bleeding stops.  Cleanse your perineum from front to back  If used, stitches will dissolve in 4-6 weeks.  You may use a sitz bath or soak in a clean tub as needed for comfort.  Kegel exercises will help restore bladder control.     SWELLING  Try to keep your legs elevated when you are sitting.   When lying down keep your legs elevated.  When wearing stocking or socks, make sure they are not too tight.    WHEN TO CALL THE DOCTOR  If you have a temp of 100.6 or more.   If your bleeding has increased and you are saturating a pad in an hour.  Your abdomen is tender to touch.  You are passing blood clots bigger than the size of a lemon.  If you are experiencing extreme weakness or dizziness.   If you are having  flu-like symptoms such as achy muscles or joints.  There is a foul smell or a green color to your vaginal bleeding.  If you have pain that cannot be relieved.  You have persistent burning or frequency with urination.  Call if you have concerns about your well-being.  You are unable to sleep, eat, or are having thoughts of harming yourself or your baby.   You have swelling, bleeding, drainage, foul odor, redness, or warmth in/around your incision or stitches.  You have a red, warm, tender area in your calf.

## 2024-07-22 ENCOUNTER — LACTATION ENCOUNTER (OUTPATIENT)
Dept: LABOR AND DELIVERY | Age: 34
End: 2024-07-22

## 2024-07-22 NOTE — LACTATION NOTE
elevation and chomping noted   Neck: [x] WNL  [] Head/neck preference    Eyes: Clear    Respiratory: [x]  WNL     GI: [x]  gassy, spits up frequently  Musculoskeletal/Neuro: [x] WNL      Feeding History:  # of feeds in 24 hours: every 2 hours during the day, clusters from 7-10 each evening, every 4 hours at night. Approx 8-12 feedings in 24 ours Duration/side: varies  Supplements: none Activity during feedings: [x] Alert  [] Sleepy  [] Fussy    Elimination:  Wet diapers in 24 hours: >6 Stools in 24 hours: multiple  Color: yellow, seedy    Vital Signs:   Weight: 3898 g - weighed in clothing, as parents do not have concerns about infant weight. Has surpassed birth weight prior to today.  Post feed 1st Breast: 3944 g  Post feed 2nd Breast:    Total Milk Intake: 46 ml    Breastfeeding Observation and Assessment:   Side:   left  Rooting/Cues: roots  Position: cradle  Latch: upper lip curls inward, piston like motion of jaw  Audible Swallows: yes  Breast Softens: yes  Satiety Cues: self detaches  Comments: mom states that infant fed prior to this visit. This was a 7 minute feeding, which is shorter than normal.      Intervention/Plan: Parents first child had a tie release with minimal improvement at that time. We discussed oral habilitation, and the fact that Jeff is latching as well as he can with his ties. Mom agrees. Suggested that they do oral stretches with each diaper change, at least six times daily, for 1.5-2 weeks. Recommended a trusted pediatric chiropractic provider. MOm will call for appointment.  Stretches:  Lip stretch and smooth  Cheek massage  Tongue elevations    Demonstrated head hangs and rhythmic movements.   Parents verbalize understanding.      Follow Up:  Mom will call for appt after 1-2 chiro visits and regular oral stretches.

## 2024-08-14 ENCOUNTER — LACTATION ENCOUNTER (OUTPATIENT)
Dept: LABOR AND DELIVERY | Age: 34
End: 2024-08-14

## 2024-08-14 NOTE — LACTATION NOTE
This note was copied from a baby's chart.  Date of office visit 2024    Infant's Name  Jeff Hutchinson   [birth year]24    Mother's Name Extended Emergency Contact Information  Primary Emergency Contact: Laz Hutchinson  Home Phone: [phone]  Mobile Phone: [phone]  Relation: Father  Secondary Emergency Contact: LUPE HUTCHINSON  Address: [address] [place] [address]  Home Phone: [phone]  Mobile Phone: [phone]  Relation: Mother phone [phone]    Mother's Provider Collette            Infant Provider Sven     : 2  Para: 2  Gest Age @ Delivery: Gestational Age: 39w1d  Type of Delivery: vaginal     Pregnancy/Delivery Complications: neg     Significant Maternal Health History: hx anxiety     Maternal Medications: zoloft 10 mg.    Infant Birth Wt: Birth Weight: 3.572 kg (7 lb 14 oz)        Present Age: 4 wk.o.     Reason for Visit: two day follow up post tie release    Breast Assessment:  Breast Appearance/size:  [] S/IGT [x] Average    [] Lg    [x] Soft  [] Full  [] Engorged  Past surgery/scars    Supply: [] Low   [x] Normal  [] Oversupply  Nipples:  [] Flat   [] Inverted   [] Invert w/ compression   [x] Protrude  Trauma: [x] None [] Bruise [] Wound    Pain:    Pumping: Has not been pumping. Direct feeding only    Infant Exam:  General: Well cared for appearance    Behavior: Alert   [] Active [x] Quiet  [place]: Soft, Flat    Mucous Membranes: Moist    Skin: Slight  rash on face - mom thinks it may be from baby wearing and spit up.  ENT: WNL    Mouth:  Upper lip: released - wide area, healing   Buccal: released, healing  Tongue lateralization: [] Adequate [x] Limited  Tongue protrusion: [] Adequate [x] Limited  Tongue position in mouth: mid  Lingual frenum: released, healing  Suck assessment: unable to assess  Neck: [x] WNL  [] Head/neck preference    Eyes: Clear    Respiratory: [x]  WNL     GI: [x] Gassy, spit up - mom feels this

## 2024-08-28 ENCOUNTER — LACTATION ENCOUNTER (OUTPATIENT)
Dept: LABOR AND DELIVERY | Age: 34
End: 2024-08-28

## 2024-08-28 NOTE — LACTATION NOTE
This note was copied from a baby's chart.  Date of office visit 2024    Infant's Name  Jeff Hutchinson   [birth year]24    Mother's Name Extended Emergency Contact Information  Primary Emergency Contact: Laz Hutchinson  Home Phone: [phone]  Mobile Phone: [phone]  Relation: Father  Secondary Emergency Contact: LUPE HUTCHINSON  Address: [address] [place] [address]  Home Phone: [phone]  Mobile Phone: [phone]  Relation: Mother phone [phone]    Mother's Provider Collette            Infant Provider Sven     : 2  Para: 2  Gest Age @ Delivery: Gestational Age: 39w1d  Type of Delivery: vaginal     Pregnancy/Delivery Complications: neg     Significant Maternal Health History: hx anxiety     Maternal Medications: zoloft 10 mg.    Infant Birth Wt: Birth Weight: 3.572 kg (7 lb 14 oz)        Present Age: 6 wk.o.     Reason for Visit: two week post release follow up    Breast Assessment:  Breast Appearance/size:        [] S/IGT          [x] Average         [] Lg                          [x] Soft             [] Full             [] Engorged  Past surgery/scars    Supply:            [] Low            [x] Normal                   [] Oversupply  Nipples:  [] Flat          [] Inverted      [] Invert w/ compression       [x] Protrude  Trauma: [x] None        [] Bruise         [] Wound    Pain:    Pumping: Has not been pumping. Direct feeding only    Infant Exam:  General: Well cared for appearance    Behavior: Alert   [] Active [x] Quiet  [place]: Soft, Flat    Mucous Membranes: Moist    Skin: Clear    ENT: WNL    Mouth:  Upper lip: released, healing well   Buccal: released, healing well  Tongue lateralization: [] Adequate [x] Limited - improving  Tongue protrusion: [] Adequate [x] Limited - improving  Tongue position in mouth: mid  Lingual frenum: released, healing, noted band of tissue/posterior frenum  Suck assessment: infant slightly gaggy, does